# Patient Record
Sex: FEMALE | Race: WHITE | NOT HISPANIC OR LATINO | Employment: FULL TIME | URBAN - METROPOLITAN AREA
[De-identification: names, ages, dates, MRNs, and addresses within clinical notes are randomized per-mention and may not be internally consistent; named-entity substitution may affect disease eponyms.]

---

## 2020-01-21 ASSESSMENT — MIFFLIN-ST. JEOR: SCORE: 1561.63

## 2020-01-22 ENCOUNTER — ANESTHESIA - HEALTHEAST (OUTPATIENT)
Dept: SURGERY | Facility: AMBULATORY SURGERY CENTER | Age: 40
End: 2020-01-22

## 2020-01-23 ENCOUNTER — SURGERY - HEALTHEAST (OUTPATIENT)
Dept: SURGERY | Facility: AMBULATORY SURGERY CENTER | Age: 40
End: 2020-01-23

## 2020-01-23 ASSESSMENT — MIFFLIN-ST. JEOR: SCORE: 1557.66

## 2021-06-04 VITALS — HEIGHT: 66 IN | WEIGHT: 192 LBS | BODY MASS INDEX: 30.86 KG/M2

## 2021-06-05 NOTE — ANESTHESIA CARE TRANSFER NOTE
Last vitals:   Vitals:    01/23/20 1259   BP: 139/75   Pulse: (!) 101   Resp: 16   Temp: 36.2  C (97.2  F)   SpO2: 100%     Patient's level of consciousness is drowsy  Spontaneous respirations: yes  Maintains airway independently: yes  Dentition unchanged: yes  Oropharynx: oropharynx clear of all foreign objects    QCDR Measures:  ASA# 20 - Surgical Safety Checklist: WHO surgical safety checklist completed prior to induction    PQRS# 430 - Adult PONV Prevention: 4558F - Pt received => 2 anti-emetic agents (different classes) preop & intraop  ASA# 8 - Peds PONV Prevention: NA - Not pediatric patient, not GA or 2 or more risk factors NOT present  PQRS# 424 - Petra-op Temp Management: 4559F - At least one body temp DOCUMENTED => 35.5C or 95.9F within required timeframe  PQRS# 426 - PACU Transfer Protocol: - Transfer of care checklist used  ASA# 14 - Acute Post-op Pain: ASA14B - Patient did NOT experience pain >= 7 out of 10

## 2021-06-05 NOTE — ANESTHESIA POSTPROCEDURE EVALUATION
Patient: Linus Lewis  Procedure(s):  LAPAROSCOPIC LEFT OVARIAN CYSTECTOMY, RIGHT OVARIAN CYSTOTOMY  Anesthesia type: general    Patient location: PACU  Last vitals:   Vitals Value Taken Time   /104 1/23/2020  1:45 PM   Temp 36.2  C (97.2  F) 1/23/2020 12:59 PM   Pulse 104 1/23/2020  1:55 PM   Resp 16 1/23/2020  1:34 PM   SpO2 99 % 1/23/2020  1:55 PM   Vitals shown include unvalidated device data.  Post vital signs: stable  Level of consciousness: awake and responds to simple questions  Post-anesthesia pain: pain controlled  Post-anesthesia nausea and vomiting: no  Pulmonary: unassisted  Cardiovascular: stable  Hydration: adequate  Anesthetic events: no    QCDR Measures:  ASA# 11 - Petra-op Cardiac Arrest: ASA11B - Patient did NOT experience unanticipated cardiac arrest  ASA# 12 - Petra-op Mortality Rate: ASA12B - Patient did NOT die  ASA# 13 - PACU Re-Intubation Rate: ASA13B - Patient did NOT require a new airway mgmt  ASA# 10 - Composite Anes Safety: ASA10A - No serious adverse event    Additional Notes:

## 2021-07-03 NOTE — ANESTHESIA PREPROCEDURE EVALUATION
Anesthesia Preprocedure Evaluation by Zamzam Isaac MD at 1/23/2020 11:24 AM     Author: Zamzam Isaac MD Service: -- Author Type: Physician    Filed: 1/23/2020 11:25 AM Date of Service: 1/23/2020 11:24 AM Status: Signed    : Zamzam Isaac MD (Physician)       Anesthesia Evaluation      Patient summary reviewed   No history of anesthetic complications     Airway   Mallampati: II  Neck ROM: full   Pulmonary - negative ROS and normal exam                          Cardiovascular - negative ROS and normal exam   Neuro/Psych    (+) neuromuscular disease,  depression, anxiety/panic attacks,     Endo/Other    (+) hypothyroidism, obesity (BMI 31),      GI/Hepatic/Renal - negative ROS           Dental                             Anesthesia Plan  Planned anesthetic: general endotracheal    ASA 2   Induction: intravenous   Anesthetic plan and risks discussed with: patient    Post-op plan: routine recovery

## 2022-07-08 LAB — GROUP B STREPTOCOCCUS (EXTERNAL): POSITIVE

## 2022-07-24 ENCOUNTER — ANESTHESIA (OUTPATIENT)
Dept: SURGERY | Facility: CLINIC | Age: 42
End: 2022-07-24
Payer: COMMERCIAL

## 2022-07-24 ENCOUNTER — ANESTHESIA EVENT (OUTPATIENT)
Dept: SURGERY | Facility: CLINIC | Age: 42
End: 2022-07-24
Payer: COMMERCIAL

## 2022-07-24 ENCOUNTER — HOSPITAL ENCOUNTER (INPATIENT)
Facility: CLINIC | Age: 42
LOS: 4 days | Discharge: HOME HEALTH | End: 2022-07-28
Attending: OBSTETRICS & GYNECOLOGY | Admitting: OBSTETRICS & GYNECOLOGY
Payer: COMMERCIAL

## 2022-07-24 DIAGNOSIS — D62 ABLA (ACUTE BLOOD LOSS ANEMIA): ICD-10-CM

## 2022-07-24 DIAGNOSIS — O13.9 GESTATIONAL HYPERTENSION, ANTEPARTUM: ICD-10-CM

## 2022-07-24 LAB
ABO/RH(D): NORMAL
ALT SERPL W P-5'-P-CCNC: 23 U/L (ref 0–50)
ANTIBODY SCREEN: NEGATIVE
AST SERPL W P-5'-P-CCNC: 19 U/L (ref 0–45)
CREAT SERPL-MCNC: 0.66 MG/DL (ref 0.52–1.04)
CREAT UR-MCNC: 143 MG/DL
ERYTHROCYTE [DISTWIDTH] IN BLOOD BY AUTOMATED COUNT: 15.4 % (ref 10–15)
GFR SERPL CREATININE-BSD FRML MDRD: >90 ML/MIN/1.73M2
HCT VFR BLD AUTO: 38.2 % (ref 35–47)
HGB BLD-MCNC: 12.2 G/DL (ref 11.7–15.7)
MCH RBC QN AUTO: 27.7 PG (ref 26.5–33)
MCHC RBC AUTO-ENTMCNC: 31.9 G/DL (ref 31.5–36.5)
MCV RBC AUTO: 87 FL (ref 78–100)
PLATELET # BLD AUTO: 183 10E3/UL (ref 150–450)
PROT UR-MCNC: 0.38 G/L
PROT/CREAT 24H UR: 0.27 G/G CR (ref 0–0.2)
RBC # BLD AUTO: 4.41 10E6/UL (ref 3.8–5.2)
SARS-COV-2 RNA RESP QL NAA+PROBE: NEGATIVE
SPECIMEN EXPIRATION DATE: NORMAL
T PALLIDUM AB SER QL: NONREACTIVE
WBC # BLD AUTO: 11.5 10E3/UL (ref 4–11)

## 2022-07-24 PROCEDURE — 250N000009 HC RX 250: Performed by: OBSTETRICS & GYNECOLOGY

## 2022-07-24 PROCEDURE — 258N000003 HC RX IP 258 OP 636: Performed by: OBSTETRICS & GYNECOLOGY

## 2022-07-24 PROCEDURE — 120N000001 HC R&B MED SURG/OB

## 2022-07-24 PROCEDURE — 85027 COMPLETE CBC AUTOMATED: CPT | Performed by: OBSTETRICS & GYNECOLOGY

## 2022-07-24 PROCEDURE — 84460 ALANINE AMINO (ALT) (SGPT): CPT | Performed by: OBSTETRICS & GYNECOLOGY

## 2022-07-24 PROCEDURE — U0005 INFEC AGEN DETEC AMPLI PROBE: HCPCS | Performed by: OBSTETRICS & GYNECOLOGY

## 2022-07-24 PROCEDURE — 3E0DXGC INTRODUCTION OF OTHER THERAPEUTIC SUBSTANCE INTO MOUTH AND PHARYNX, EXTERNAL APPROACH: ICD-10-PCS | Performed by: OBSTETRICS & GYNECOLOGY

## 2022-07-24 PROCEDURE — 86850 RBC ANTIBODY SCREEN: CPT | Performed by: OBSTETRICS & GYNECOLOGY

## 2022-07-24 PROCEDURE — 250N000011 HC RX IP 250 OP 636

## 2022-07-24 PROCEDURE — 3E033VJ INTRODUCTION OF OTHER HORMONE INTO PERIPHERAL VEIN, PERCUTANEOUS APPROACH: ICD-10-PCS | Performed by: OBSTETRICS & GYNECOLOGY

## 2022-07-24 PROCEDURE — 250N000011 HC RX IP 250 OP 636: Performed by: ANESTHESIOLOGY

## 2022-07-24 PROCEDURE — 86780 TREPONEMA PALLIDUM: CPT | Performed by: OBSTETRICS & GYNECOLOGY

## 2022-07-24 PROCEDURE — 36415 COLL VENOUS BLD VENIPUNCTURE: CPT | Performed by: OBSTETRICS & GYNECOLOGY

## 2022-07-24 PROCEDURE — 84156 ASSAY OF PROTEIN URINE: CPT | Performed by: OBSTETRICS & GYNECOLOGY

## 2022-07-24 PROCEDURE — 250N000011 HC RX IP 250 OP 636: Performed by: OBSTETRICS & GYNECOLOGY

## 2022-07-24 PROCEDURE — 84450 TRANSFERASE (AST) (SGOT): CPT | Performed by: OBSTETRICS & GYNECOLOGY

## 2022-07-24 PROCEDURE — G0463 HOSPITAL OUTPT CLINIC VISIT: HCPCS

## 2022-07-24 PROCEDURE — 82565 ASSAY OF CREATININE: CPT | Performed by: OBSTETRICS & GYNECOLOGY

## 2022-07-24 PROCEDURE — 250N000013 HC RX MED GY IP 250 OP 250 PS 637: Performed by: OBSTETRICS & GYNECOLOGY

## 2022-07-24 RX ORDER — TERBUTALINE SULFATE 1 MG/ML
0.25 INJECTION, SOLUTION SUBCUTANEOUS
Status: DISCONTINUED | OUTPATIENT
Start: 2022-07-24 | End: 2022-07-25 | Stop reason: HOSPADM

## 2022-07-24 RX ORDER — MISOPROSTOL 200 UG/1
400 TABLET ORAL
Status: DISCONTINUED | OUTPATIENT
Start: 2022-07-24 | End: 2022-07-25 | Stop reason: HOSPADM

## 2022-07-24 RX ORDER — METHYLERGONOVINE MALEATE 0.2 MG/ML
200 INJECTION INTRAVENOUS
Status: DISCONTINUED | OUTPATIENT
Start: 2022-07-24 | End: 2022-07-25 | Stop reason: HOSPADM

## 2022-07-24 RX ORDER — OXYTOCIN/0.9 % SODIUM CHLORIDE 30/500 ML
100-340 PLASTIC BAG, INJECTION (ML) INTRAVENOUS CONTINUOUS PRN
Status: DISCONTINUED | OUTPATIENT
Start: 2022-07-24 | End: 2022-07-25 | Stop reason: CLARIF

## 2022-07-24 RX ORDER — FENTANYL CITRATE 50 UG/ML
100 INJECTION, SOLUTION INTRAMUSCULAR; INTRAVENOUS ONCE
Status: COMPLETED | OUTPATIENT
Start: 2022-07-24 | End: 2022-07-24

## 2022-07-24 RX ORDER — PROCHLORPERAZINE 25 MG
25 SUPPOSITORY, RECTAL RECTAL EVERY 12 HOURS PRN
Status: DISCONTINUED | OUTPATIENT
Start: 2022-07-24 | End: 2022-07-25 | Stop reason: HOSPADM

## 2022-07-24 RX ORDER — FENTANYL CITRATE 50 UG/ML
100 INJECTION, SOLUTION INTRAMUSCULAR; INTRAVENOUS
Status: DISCONTINUED | OUTPATIENT
Start: 2022-07-24 | End: 2022-07-25 | Stop reason: HOSPADM

## 2022-07-24 RX ORDER — MAGNESIUM SULFATE HEPTAHYDRATE 40 MG/ML
2 INJECTION, SOLUTION INTRAVENOUS
Status: DISCONTINUED | OUTPATIENT
Start: 2022-07-24 | End: 2022-07-28 | Stop reason: HOSPADM

## 2022-07-24 RX ORDER — MISOPROSTOL 200 UG/1
800 TABLET ORAL
Status: DISCONTINUED | OUTPATIENT
Start: 2022-07-24 | End: 2022-07-25 | Stop reason: HOSPADM

## 2022-07-24 RX ORDER — LIDOCAINE 40 MG/G
CREAM TOPICAL
Status: DISCONTINUED | OUTPATIENT
Start: 2022-07-24 | End: 2022-07-25

## 2022-07-24 RX ORDER — METOCLOPRAMIDE HYDROCHLORIDE 5 MG/ML
10 INJECTION INTRAMUSCULAR; INTRAVENOUS EVERY 6 HOURS PRN
Status: DISCONTINUED | OUTPATIENT
Start: 2022-07-24 | End: 2022-07-25 | Stop reason: HOSPADM

## 2022-07-24 RX ORDER — NALOXONE HYDROCHLORIDE 0.4 MG/ML
0.2 INJECTION, SOLUTION INTRAMUSCULAR; INTRAVENOUS; SUBCUTANEOUS
Status: DISCONTINUED | OUTPATIENT
Start: 2022-07-24 | End: 2022-07-25 | Stop reason: HOSPADM

## 2022-07-24 RX ORDER — KETOROLAC TROMETHAMINE 30 MG/ML
30 INJECTION, SOLUTION INTRAMUSCULAR; INTRAVENOUS
Status: DISCONTINUED | OUTPATIENT
Start: 2022-07-24 | End: 2022-07-25 | Stop reason: CLARIF

## 2022-07-24 RX ORDER — OXYTOCIN 10 [USP'U]/ML
10 INJECTION, SOLUTION INTRAMUSCULAR; INTRAVENOUS
Status: DISCONTINUED | OUTPATIENT
Start: 2022-07-24 | End: 2022-07-25 | Stop reason: CLARIF

## 2022-07-24 RX ORDER — ACETAMINOPHEN 500 MG
1000 TABLET ORAL EVERY 6 HOURS PRN
Status: DISCONTINUED | OUTPATIENT
Start: 2022-07-24 | End: 2022-07-25 | Stop reason: HOSPADM

## 2022-07-24 RX ORDER — PENICILLIN G 3000000 [IU]/50ML
3 INJECTION, SOLUTION INTRAVENOUS EVERY 4 HOURS
Status: DISCONTINUED | OUTPATIENT
Start: 2022-07-24 | End: 2022-07-25 | Stop reason: HOSPADM

## 2022-07-24 RX ORDER — HYDROXYZINE HYDROCHLORIDE 50 MG/1
50 TABLET, FILM COATED ORAL
Status: DISCONTINUED | OUTPATIENT
Start: 2022-07-24 | End: 2022-07-24

## 2022-07-24 RX ORDER — NALBUPHINE HYDROCHLORIDE 10 MG/ML
2.5-5 INJECTION, SOLUTION INTRAMUSCULAR; INTRAVENOUS; SUBCUTANEOUS EVERY 6 HOURS PRN
Status: ACTIVE | OUTPATIENT
Start: 2022-07-24 | End: 2022-07-26

## 2022-07-24 RX ORDER — LORAZEPAM 2 MG/ML
2 INJECTION INTRAMUSCULAR
Status: DISCONTINUED | OUTPATIENT
Start: 2022-07-24 | End: 2022-07-28 | Stop reason: HOSPADM

## 2022-07-24 RX ORDER — PNV NO.95/FERROUS FUM/FOLIC AC 28MG-0.8MG
1 TABLET ORAL DAILY
COMMUNITY

## 2022-07-24 RX ORDER — TRANEXAMIC ACID 10 MG/ML
1 INJECTION, SOLUTION INTRAVENOUS EVERY 30 MIN PRN
Status: DISCONTINUED | OUTPATIENT
Start: 2022-07-24 | End: 2022-07-25 | Stop reason: HOSPADM

## 2022-07-24 RX ORDER — ONDANSETRON 4 MG/1
4 TABLET, ORALLY DISINTEGRATING ORAL EVERY 6 HOURS PRN
Status: DISCONTINUED | OUTPATIENT
Start: 2022-07-24 | End: 2022-07-25 | Stop reason: HOSPADM

## 2022-07-24 RX ORDER — PANTOPRAZOLE SODIUM 40 MG/1
40 TABLET, DELAYED RELEASE ORAL
Status: DISCONTINUED | OUTPATIENT
Start: 2022-07-24 | End: 2022-07-28 | Stop reason: HOSPADM

## 2022-07-24 RX ORDER — MAGNESIUM SULFATE HEPTAHYDRATE 40 MG/ML
4 INJECTION, SOLUTION INTRAVENOUS
Status: DISCONTINUED | OUTPATIENT
Start: 2022-07-24 | End: 2022-07-28 | Stop reason: HOSPADM

## 2022-07-24 RX ORDER — TERBUTALINE SULFATE 1 MG/ML
0.25 INJECTION, SOLUTION SUBCUTANEOUS
Status: DISCONTINUED | OUTPATIENT
Start: 2022-07-24 | End: 2022-07-24

## 2022-07-24 RX ORDER — LABETALOL HYDROCHLORIDE 5 MG/ML
20-80 INJECTION, SOLUTION INTRAVENOUS EVERY 10 MIN PRN
Status: DISCONTINUED | OUTPATIENT
Start: 2022-07-24 | End: 2022-07-28 | Stop reason: HOSPADM

## 2022-07-24 RX ORDER — LIDOCAINE 40 MG/G
CREAM TOPICAL
Status: DISCONTINUED | OUTPATIENT
Start: 2022-07-24 | End: 2022-07-25 | Stop reason: HOSPADM

## 2022-07-24 RX ORDER — SODIUM CHLORIDE, SODIUM LACTATE, POTASSIUM CHLORIDE, CALCIUM CHLORIDE 600; 310; 30; 20 MG/100ML; MG/100ML; MG/100ML; MG/100ML
10-125 INJECTION, SOLUTION INTRAVENOUS CONTINUOUS
Status: DISCONTINUED | OUTPATIENT
Start: 2022-07-24 | End: 2022-07-24

## 2022-07-24 RX ORDER — OXYTOCIN/0.9 % SODIUM CHLORIDE 30/500 ML
340 PLASTIC BAG, INJECTION (ML) INTRAVENOUS CONTINUOUS PRN
Status: DISCONTINUED | OUTPATIENT
Start: 2022-07-24 | End: 2022-07-25 | Stop reason: HOSPADM

## 2022-07-24 RX ORDER — MISOPROSTOL 100 UG/1
25 TABLET ORAL
Status: DISCONTINUED | OUTPATIENT
Start: 2022-07-24 | End: 2022-07-24

## 2022-07-24 RX ORDER — PENICILLIN G POTASSIUM 5000000 [IU]/1
5 INJECTION, POWDER, FOR SOLUTION INTRAMUSCULAR; INTRAVENOUS ONCE
Status: COMPLETED | OUTPATIENT
Start: 2022-07-24 | End: 2022-07-24

## 2022-07-24 RX ORDER — NALOXONE HYDROCHLORIDE 0.4 MG/ML
0.4 INJECTION, SOLUTION INTRAMUSCULAR; INTRAVENOUS; SUBCUTANEOUS
Status: DISCONTINUED | OUTPATIENT
Start: 2022-07-24 | End: 2022-07-25 | Stop reason: HOSPADM

## 2022-07-24 RX ORDER — ACETAMINOPHEN 325 MG/1
650 TABLET ORAL EVERY 4 HOURS PRN
Status: DISCONTINUED | OUTPATIENT
Start: 2022-07-24 | End: 2022-07-24

## 2022-07-24 RX ORDER — METOCLOPRAMIDE 10 MG/1
10 TABLET ORAL EVERY 6 HOURS PRN
Status: DISCONTINUED | OUTPATIENT
Start: 2022-07-24 | End: 2022-07-25 | Stop reason: HOSPADM

## 2022-07-24 RX ORDER — HYDRALAZINE HYDROCHLORIDE 20 MG/ML
10 INJECTION INTRAMUSCULAR; INTRAVENOUS
Status: DISCONTINUED | OUTPATIENT
Start: 2022-07-24 | End: 2022-07-28 | Stop reason: HOSPADM

## 2022-07-24 RX ORDER — EPHEDRINE SULFATE 50 MG/ML
5 INJECTION, SOLUTION INTRAMUSCULAR; INTRAVENOUS; SUBCUTANEOUS
Status: DISCONTINUED | OUTPATIENT
Start: 2022-07-24 | End: 2022-07-25 | Stop reason: HOSPADM

## 2022-07-24 RX ORDER — FENTANYL/BUPIVACAINE/NS/PF 2-1250MCG
PLASTIC BAG, INJECTION (ML) INJECTION
Status: COMPLETED
Start: 2022-07-24 | End: 2022-07-24

## 2022-07-24 RX ORDER — CARBOPROST TROMETHAMINE 250 UG/ML
250 INJECTION, SOLUTION INTRAMUSCULAR
Status: DISCONTINUED | OUTPATIENT
Start: 2022-07-24 | End: 2022-07-25 | Stop reason: HOSPADM

## 2022-07-24 RX ORDER — PANTOPRAZOLE SODIUM 40 MG/1
40 TABLET, DELAYED RELEASE ORAL
Status: DISCONTINUED | OUTPATIENT
Start: 2022-07-25 | End: 2022-07-24

## 2022-07-24 RX ORDER — SODIUM CHLORIDE, SODIUM LACTATE, POTASSIUM CHLORIDE, CALCIUM CHLORIDE 600; 310; 30; 20 MG/100ML; MG/100ML; MG/100ML; MG/100ML
INJECTION, SOLUTION INTRAVENOUS CONTINUOUS PRN
Status: DISCONTINUED | OUTPATIENT
Start: 2022-07-24 | End: 2022-07-25 | Stop reason: HOSPADM

## 2022-07-24 RX ORDER — IBUPROFEN 800 MG/1
800 TABLET, FILM COATED ORAL
Status: DISCONTINUED | OUTPATIENT
Start: 2022-07-24 | End: 2022-07-25 | Stop reason: CLARIF

## 2022-07-24 RX ORDER — SODIUM CHLORIDE, SODIUM LACTATE, POTASSIUM CHLORIDE, CALCIUM CHLORIDE 600; 310; 30; 20 MG/100ML; MG/100ML; MG/100ML; MG/100ML
INJECTION, SOLUTION INTRAVENOUS CONTINUOUS
Status: DISCONTINUED | OUTPATIENT
Start: 2022-07-24 | End: 2022-07-25 | Stop reason: HOSPADM

## 2022-07-24 RX ORDER — MORPHINE SULFATE 10 MG/ML
10 INJECTION, SOLUTION INTRAMUSCULAR; INTRAVENOUS
Status: DISCONTINUED | OUTPATIENT
Start: 2022-07-24 | End: 2022-07-24

## 2022-07-24 RX ORDER — OXYTOCIN 10 [USP'U]/ML
10 INJECTION, SOLUTION INTRAMUSCULAR; INTRAVENOUS
Status: DISCONTINUED | OUTPATIENT
Start: 2022-07-24 | End: 2022-07-25 | Stop reason: HOSPADM

## 2022-07-24 RX ORDER — ONDANSETRON 2 MG/ML
4 INJECTION INTRAMUSCULAR; INTRAVENOUS EVERY 6 HOURS PRN
Status: DISCONTINUED | OUTPATIENT
Start: 2022-07-24 | End: 2022-07-25 | Stop reason: HOSPADM

## 2022-07-24 RX ORDER — MAGNESIUM SULFATE HEPTAHYDRATE 500 MG/ML
10 INJECTION, SOLUTION INTRAMUSCULAR; INTRAVENOUS
Status: DISCONTINUED | OUTPATIENT
Start: 2022-07-24 | End: 2022-07-28 | Stop reason: HOSPADM

## 2022-07-24 RX ORDER — PROCHLORPERAZINE MALEATE 10 MG
10 TABLET ORAL EVERY 6 HOURS PRN
Status: DISCONTINUED | OUTPATIENT
Start: 2022-07-24 | End: 2022-07-25 | Stop reason: HOSPADM

## 2022-07-24 RX ORDER — OXYTOCIN/0.9 % SODIUM CHLORIDE 30/500 ML
1-24 PLASTIC BAG, INJECTION (ML) INTRAVENOUS CONTINUOUS
Status: DISCONTINUED | OUTPATIENT
Start: 2022-07-24 | End: 2022-07-25 | Stop reason: HOSPADM

## 2022-07-24 RX ORDER — FENTANYL CITRATE 50 UG/ML
INJECTION, SOLUTION INTRAMUSCULAR; INTRAVENOUS
Status: COMPLETED | OUTPATIENT
Start: 2022-07-24 | End: 2022-07-24

## 2022-07-24 RX ORDER — CITRIC ACID/SODIUM CITRATE 334-500MG
30 SOLUTION, ORAL ORAL
Status: DISCONTINUED | OUTPATIENT
Start: 2022-07-24 | End: 2022-07-25 | Stop reason: HOSPADM

## 2022-07-24 RX ADMIN — MISOPROSTOL 25 MCG: 100 TABLET ORAL at 10:14

## 2022-07-24 RX ADMIN — Medication: at 17:27

## 2022-07-24 RX ADMIN — FENTANYL CITRATE 100 MCG: 0.05 INJECTION, SOLUTION INTRAMUSCULAR; INTRAVENOUS at 11:18

## 2022-07-24 RX ADMIN — ONDANSETRON 4 MG: 2 INJECTION INTRAMUSCULAR; INTRAVENOUS at 07:42

## 2022-07-24 RX ADMIN — FENTANYL CITRATE 100 MCG: 50 INJECTION, SOLUTION INTRAMUSCULAR; INTRAVENOUS at 12:11

## 2022-07-24 RX ADMIN — Medication: at 12:20

## 2022-07-24 RX ADMIN — ACETAMINOPHEN 1000 MG: 500 TABLET, FILM COATED ORAL at 09:56

## 2022-07-24 RX ADMIN — SODIUM CHLORIDE, POTASSIUM CHLORIDE, SODIUM LACTATE AND CALCIUM CHLORIDE 500 ML: 600; 310; 30; 20 INJECTION, SOLUTION INTRAVENOUS at 11:31

## 2022-07-24 RX ADMIN — Medication: at 23:07

## 2022-07-24 RX ADMIN — LABETALOL HYDROCHLORIDE 20 MG: 5 INJECTION, SOLUTION INTRAVENOUS at 12:33

## 2022-07-24 RX ADMIN — PENICILLIN G POTASSIUM 5 MILLION UNITS: 5000000 POWDER, FOR SOLUTION INTRAMUSCULAR; INTRAPLEURAL; INTRATHECAL; INTRAVENOUS at 11:22

## 2022-07-24 RX ADMIN — Medication 2 MILLI-UNITS/MIN: at 17:15

## 2022-07-24 RX ADMIN — SODIUM CHLORIDE, POTASSIUM CHLORIDE, SODIUM LACTATE AND CALCIUM CHLORIDE: 600; 310; 30; 20 INJECTION, SOLUTION INTRAVENOUS at 16:24

## 2022-07-24 RX ADMIN — PENICILLIN G 3 MILLION UNITS: 3000000 INJECTION, SOLUTION INTRAVENOUS at 23:28

## 2022-07-24 RX ADMIN — PENICILLIN G 3 MILLION UNITS: 3000000 INJECTION, SOLUTION INTRAVENOUS at 15:38

## 2022-07-24 RX ADMIN — PENICILLIN G 3 MILLION UNITS: 3000000 INJECTION, SOLUTION INTRAVENOUS at 19:22

## 2022-07-24 RX ADMIN — PANTOPRAZOLE SODIUM 40 MG: 40 TABLET, DELAYED RELEASE ORAL at 15:47

## 2022-07-24 ASSESSMENT — ACTIVITIES OF DAILY LIVING (ADL)
ADLS_ACUITY_SCORE: 18
ADLS_ACUITY_SCORE: 31
ADLS_ACUITY_SCORE: 18

## 2022-07-24 NOTE — ANESTHESIA PREPROCEDURE EVALUATION
Anesthesia Pre-Procedure Evaluation    Patient: Linus Lewis   MRN: [id number] : 1980        Procedure :           Past Medical History:   Diagnosis Date     Anxiety      Depression      Disease of thyroid gland      Fibromyalgia       Past Surgical History:   Procedure Laterality Date     INSERT INTRACORONARY STENT Left     Arm     MS LAP,RMV  ADNEXAL STRUCTURE N/A 2020    Procedure: LAPAROSCOPIC LEFT OVARIAN CYSTECTOMY, RIGHT OVARIAN CYSTOTOMY;  Surgeon: Lito Matt MD;  Location: [facility];  Service: Gynecology      No Known Allergies   Social History     Tobacco Use     Smoking status: Never Smoker     Smokeless tobacco: Never Used   Substance Use Topics     Alcohol use: Not Currently     Comment: Alcoholic Drinks/day: Rare      Wt Readings from Last 1 Encounters:   22 108.9 kg (240 lb)        Anesthesia Evaluation   Pt has had prior anesthetic.     No history of anesthetic complications       ROS/MED HX  ENT/Pulmonary:  - neg pulmonary ROS     Neurologic:  - neg neurologic ROS     Cardiovascular:  - neg cardiovascular ROS     METS/Exercise Tolerance:     Hematologic:       Musculoskeletal:       GI/Hepatic:  - neg GI/hepatic ROS     Renal/Genitourinary:       Endo:     (+) thyroid problem,     Psychiatric/Substance Use:     (+) psychiatric history anxiety and depression     Infectious Disease:       Malignancy:       Other:            Physical Exam    Airway         TM distance: > 3 FB   Neck ROM: full   Mouth opening: > 3 cm    Respiratory Devices and Support         Dental           Cardiovascular             Pulmonary                   OUTSIDE LABS:  CBC:   Lab Results   Component Value Date    WBC 11.5 (H) 2022    HGB 12.2 2022    HCT 38.2 2022     2022     BMP:   Lab Results   Component Value Date    CR 0.66 2022     COAGS: No results found for: PTT, INR, FIBR  POC: No results found for: BGM, HCG, HCGS  HEPATIC:   Lab Results    Component Value Date    ALT 2022    AST 2022     OTHER: No results found for: PH, LACT, A1C, VENESSA, PHOS, MAG, LIPASE, AMYLASE, TSH, T4, T3, CRP, SED    Anesthesia Plan    ASA Status:  2      Anesthesia Type: Epidural.              Consents    Anesthesia Plan(s) and associated risks, benefits, and realistic alternatives discussed. Questions answered and patient/representative(s) expressed understanding.    - Discussed:     - Discussed with:  Patient         Postoperative Care            Comments:    Other Comments:  for Category 2 tracing       neg OB ROS.       Cal Triana MD

## 2022-07-24 NOTE — PROVIDER NOTIFICATION
07/24/22 0938   Provider Notification   Provider Name/Title Dr Benton   Method of Notification At Bedside   Dr Benton at bedside talking to patient about POC. Reviewed EFM tracing, discussed contraction pattern, irreg q5-10 min. SVE done closed. Orders for buccal cytotec. May have regular diet

## 2022-07-24 NOTE — PROVIDER NOTIFICATION
07/24/22 0730   Provider Notification   Provider Name/Title Dr Benton   Method of Notification Phone   Request Evaluate - Remote   Notification Reason Status Update   Updated re: pt story, BP values. Cristo approx every 10 min, mild. EFM not yet reactive. SVE done, unable to reach cervical os, very posterior, thick. Orders to continue to monitor BP's and await lab results.

## 2022-07-24 NOTE — ANESTHESIA PROCEDURE NOTES
Epidural catheter Procedure Note    Pre-Procedure   Staff -        Anesthesiologist:  Cal Triana MD       Performed By: anesthesiologist       Location: OB       Procedure Start/Stop Times: 7/24/2022 12:03 PM and 7/24/2022 12:20 PM       Pre-Anesthestic Checklist: patient identified, IV checked, risks and benefits discussed, informed consent, monitors and equipment checked, pre-op evaluation and at physician/surgeon's request  Timeout:       Correct Patient: Yes        Correct Procedure: Yes        Correct Site: Yes        Correct Position: Yes   Procedure Documentation  Procedure: epidural catheter       Patient Position: sitting       Patient Prep/Sterile Barriers: sterile gloves, mask, patient draped       Skin prep: Betadine       Local skin infiltrated with 3 mL of 1% lidocaine.        Insertion Site: L3-4. (midline approach).       Technique: LORT saline        REECE at 6 cm.       Needle Type: Image Searchery needle       Needle Gauge: 17.        Needle Length (Inches): 3.5        Catheter: 19 G.          Catheter threaded easily.         5 cm epidural space.         Threaded 11 cm at skin.         # of attempts: 1 and  # of redirects:  0    Assessment/Narrative         Paresthesias: No.       Test dose of 3 mL lidocaine 1.5% w/ 1:200,000 epinephrine at 12:11 CDT.        .       Insertion/Infusion Method: LORT saline       Aspiration negative for Heme or CSF via Epidural Catheter.    Medication(s) Administered   0.125% Bupivacaine + 2 mcg/mL Fentanyl via CADD (Epidural) - EPIDURAL   10 mL - 7/24/2022 12:11:00 PM  Fentanyl PF (Epidural) - EPIDURAL   100 mcg - 7/24/2022 12:11:00 PM  Medication Administration Time: 7/24/2022 12:03 PM     Comments:  [id number], [id number], exp. 2024-02-29

## 2022-07-24 NOTE — PLAN OF CARE
Pt laid down to left side after epidural. BP cuff on dependent left arm. Patient experiencing very strong contraction pain, hyperventilating. Coached to deep breath and slow breaths. First BP following epidural at 1217 was severe range 168/97. Multiple severe range BP's during q2min post epidural BP checks. Will treat BP per protocol if remains severe range at 1232, as this will be 15 min post first severe range BP.

## 2022-07-24 NOTE — PROVIDER NOTIFICATION
07/24/22 1241   Provider Notification   Provider Name/Title Dr. Benton   Method of Notification Phone   Request Evaluate - Remote   Notification Reason Maternal Vital Sign Change     Dr. Benton notified of severe range BP requiring treatment per protocol with 20mg labetolol administered per BETHANY Bardales x1 after which severe range Bps resolved.

## 2022-07-24 NOTE — PROVIDER NOTIFICATION
07/24/22 1416   Provider Notification   Provider Name/Title Dr Benton   Method of Notification In Department   Notification Reason Status Update   Updated re: contractions mostly every 5 minutes, last SVE 4/100/-1 at 1300. Plan for cervical exam at 1500, if unchanged orders to start pit aug.

## 2022-07-24 NOTE — PLAN OF CARE
Data: [facility]  6:30 AM.  Reason for maternal/fetal assessment is uterine contractions, elevated blood pressures. Patient reports uterine contractions throughout the day yesterday, but getting more consistent and painful at about 3 AM. Patient also reports elevated blood pressures at home of 190 systolic.  Patient is a .  Prenatal record reviewed. Pregnancy  has been complicated by gestational hypertension; pressures have been consistently 130s-140s/80s-90s without abnormal labwork or symptoms.   Gestational Age 38w4d. VSS. Fetal movement active. Patient denies leaking of vaginal fluid/rupture of membranes, vaginal bleeding, abdominal pain, pelvic pressure, nausea, vomiting, headache, visual disturbances, epigastric or URQ pain, significant edema. Support person Brien is present.   Action: Verbal consent for EFM. Triage assessment completed. Bill of rights reviewed.  Response: Patient verbalized agreement with plan. Will contact Dr Zamzam Osman with update and for further orders.

## 2022-07-24 NOTE — PROVIDER NOTIFICATION
07/24/22 0655   Provider Notification   Provider Name/Title Dr. Mota   Method of Notification Phone     MD notified of patient arrival and admitting complaint of elevated BPs at home with painful contractions since 0300. G+P, gestation, GBS + status reviewed. Pt has history of gestational hypertension with elevated pressures throughout pregnancy with normal labwork and no symptoms. First BP is 182/101 with pulse of 90. BP set to repeat in 15 minutes from first BP. An RN is working on obtaining IV access at this time.     FHR tracing shows moderate variability, no accels, 1 variable deceleration. Two contractions present which patient is breathing through. SVE not yet completed.     TORB for intrapartum orders, hypertension management orderset with labetalol for severe range pressure treatment. Per MD no oral nifedipine at this time, give IV labetalol if repeat BP is severe range. TORB for SVE and to update Dr. Benton to determine plan for labor/induction.

## 2022-07-24 NOTE — H&P
Labor & Delivery History & Physical  Patient Name:  Linus Lewis   MRN:  [id number]  Age:  42 year old    YOB: 1980      Subjective:    Linus Lewis is a 42 year old  female with JORGE: 8/3/2022, by Patient Reported,  Gestational Age: 38w4d who presents for contractions and elevated Bps.  She says contractions started late last night, about every 6-7 mins.     She has h/o GHTN, with mild range Bps at 32 wga.  HELLP labs were normal on .  She states at home, her Bps were as high as 190/90s. Denies headache, vision change, CP, SOB, n/v, upper abdominal pain, or increased swelling.     Patient denies leaking of fluid or vaginal bleeding.  Fetal Movement: present.       Prenatal care with [facility].   Prenatal care complicated by:  - GHTN: normal HELLP labs on 22  - Thyroid disorder: TSH normal on 7/15/22   - B pos  - GBS pos: no PCN allergy      Pregnancy Episode Problems (from 22 to present)     No problems associated with this episode.        OB History    Para Term  AB Living   2 0 0 0 1 0   SAB IAB Ectopic Multiple Live Births   1 0 0 0 0      # Outcome Date GA Lbr Zaheer/2nd Weight Sex Delivery Anes PTL Lv   2 Current            1 SAB              Past Medical History:   Diagnosis Date     Anxiety      Depression      Disease of thyroid gland      Fibromyalgia      Past Surgical History:   Procedure Laterality Date     INSERT INTRACORONARY STENT Left     Arm     SC LAP,RMV  ADNEXAL STRUCTURE N/A 2020    Procedure: LAPAROSCOPIC LEFT OVARIAN CYSTECTOMY, RIGHT OVARIAN CYSTOTOMY;  Surgeon: Lito Matt MD;  Location: [facility];  Service: Gynecology     Social History     Tobacco Use     Smoking status: Never Smoker     Smokeless tobacco: Never Used   Substance Use Topics     Alcohol use: Not Currently     Comment: Alcoholic Drinks/day: Rare     Drug use: Not Currently      History   Drug Use Unknown     History reviewed. No pertinent family  history.  [unfilled]   Medications Prior to Admission   Medication Sig Dispense Refill Last Dose     omeprazole (PRILOSEC) 20 MG DR capsule Take 20 mg by mouth        sertraline (ZOLOFT) 50 MG tablet Take 50 mg by mouth daily        Prenatal Vit-Fe Fumarate-FA (PRENATAL VITAMIN) 27-0.8 MG TABS Take 1 tablet by mouth daily          There is no immunization history on file for this patient.    Review of Systems - NEGATIVE FOR  Fevers  Chills  Headache  Visual changes  Ear pain  Sore throat  Cough  Shortness of breath  Chest pain  Palpitations  Constipation  Diarrhea  Vomiting  Bloody stools  Hematuria  Dysuria  Muscle weakness  Gait disturbance    Objective:  Temp:  [98  F (36.7  C)-99.1  F (37.3  C)] 99.1  F (37.3  C)  Resp:  [18-20] 18  BP: (130-182)/() 141/80  240 lbs 0 oz      General: General appearance: alert, well appearing, and in no distress.   Lungs:   unlabored   Heart:  regular rate and rhythm   Abdomen: Gravid, nontender   [place]: Contraction Frequency (min): q 5-9 min  Contraction Duration (sec):     FHT: Baseline 140 BPM   Fetal heart variability:moderate  Fetal heart rate accelerations:  Present 15 x 15  Fetal heart rate decelerations: none  Fetal heart rate interpretation:  Category I   Speculum: NA    Cervix: Dilation:   FT  Effacement:     20  Station:             -3  Position: very posterior    Extremities: Trace to 1+ edema bilateral, symmetric edema; neg Patel's sign      Lab Review:    ABO/RH(D)   Date Value Ref Range Status   2022 B POS  Final     Hemoglobin   Date Value Ref Range Status   2022 12.2 11.7 - 15.7 g/dL Final     Hematocrit   Date Value Ref Range Status   2022 38.2 35.0 - 47.0 % Final           Assessment  Linus Lewis is a 42 year old  female with JORGE: 8/3/2022, by Patient Reported,  Gestational Age: 38w4d who presents with GHTN and contractions    Plan  - GHTN:   - HELLP labs on admission normal (Prot:Cr ratio = 0.27)   - Bps normal to mild range,  with an isolated/unsustained severe range initially   - No indication for Mag for sz ppx. Will monitor closely.  If sustained severe range, would treat with IV labetalol and start mag at that time.   - Given gestational age, will proceed with IOL now  - IOL: will need cervical ripening.  Discussed options, including cytotec, cervidil, balloon.  Due to cervical position, balloon would be very difficult to place.  Will start with PO cytotec now, and can attempt balloon at later time.   - She had questions re:CS.  We reviewed common indications, including arrest of dilation, arrest of descent, fetal indications, and uncontrolled Bps.  She declines elective PCS at this time.  She desires no more children.   - FHT: cat I.  Cont CEFM.  - Thyroid disease: TSH normal on 7/15/22   - B pos  - GBS pos: PCN to start when active labor, or at heidi of balloon placement.  - Dispo: anticipate

## 2022-07-25 PROBLEM — N17.8 OTHER ACUTE KIDNEY FAILURE (H): Status: ACTIVE | Noted: 2022-07-25

## 2022-07-25 LAB
ALBUMIN SERPL-MCNC: 1.8 G/DL (ref 3.4–5)
ALP SERPL-CCNC: 132 U/L (ref 40–150)
ALT SERPL W P-5'-P-CCNC: 16 U/L (ref 0–50)
ANION GAP SERPL CALCULATED.3IONS-SCNC: 7 MMOL/L (ref 3–14)
AST SERPL W P-5'-P-CCNC: 19 U/L (ref 0–45)
BILIRUB SERPL-MCNC: 0.6 MG/DL (ref 0.2–1.3)
BUN SERPL-MCNC: 15 MG/DL (ref 7–30)
CALCIUM SERPL-MCNC: 7.9 MG/DL (ref 8.5–10.1)
CHLORIDE BLD-SCNC: 112 MMOL/L (ref 94–109)
CO2 SERPL-SCNC: 21 MMOL/L (ref 20–32)
CREAT SERPL-MCNC: 1.11 MG/DL (ref 0.52–1.04)
CREAT SERPL-MCNC: 1.46 MG/DL (ref 0.52–1.04)
ERYTHROCYTE [DISTWIDTH] IN BLOOD BY AUTOMATED COUNT: 16 % (ref 10–15)
GFR SERPL CREATININE-BSD FRML MDRD: 46 ML/MIN/1.73M2
GFR SERPL CREATININE-BSD FRML MDRD: 64 ML/MIN/1.73M2
GLUCOSE BLD-MCNC: 151 MG/DL (ref 70–99)
HCT VFR BLD AUTO: 30.2 % (ref 35–47)
HGB BLD-MCNC: 9.5 G/DL (ref 11.7–15.7)
MCH RBC QN AUTO: 27.3 PG (ref 26.5–33)
MCHC RBC AUTO-ENTMCNC: 31.5 G/DL (ref 31.5–36.5)
MCV RBC AUTO: 87 FL (ref 78–100)
PLATELET # BLD AUTO: 156 10E3/UL (ref 150–450)
POTASSIUM BLD-SCNC: 4 MMOL/L (ref 3.4–5.3)
PROT SERPL-MCNC: 5.1 G/DL (ref 6.8–8.8)
RBC # BLD AUTO: 3.48 10E6/UL (ref 3.8–5.2)
SODIUM SERPL-SCNC: 140 MMOL/L (ref 133–144)
WBC # BLD AUTO: 19.6 10E3/UL (ref 4–11)

## 2022-07-25 PROCEDURE — 80053 COMPREHEN METABOLIC PANEL: CPT | Performed by: OBSTETRICS & GYNECOLOGY

## 2022-07-25 PROCEDURE — 250N000009 HC RX 250: Performed by: NURSE ANESTHETIST, CERTIFIED REGISTERED

## 2022-07-25 PROCEDURE — 250N000011 HC RX IP 250 OP 636: Performed by: OBSTETRICS & GYNECOLOGY

## 2022-07-25 PROCEDURE — 360N000076 HC SURGERY LEVEL 3, PER MIN: Performed by: OBSTETRICS & GYNECOLOGY

## 2022-07-25 PROCEDURE — 250N000011 HC RX IP 250 OP 636: Performed by: NURSE ANESTHETIST, CERTIFIED REGISTERED

## 2022-07-25 PROCEDURE — 36415 COLL VENOUS BLD VENIPUNCTURE: CPT | Performed by: OBSTETRICS & GYNECOLOGY

## 2022-07-25 PROCEDURE — 272N000001 HC OR GENERAL SUPPLY STERILE: Performed by: OBSTETRICS & GYNECOLOGY

## 2022-07-25 PROCEDURE — 710N000010 HC RECOVERY PHASE 1, LEVEL 2, PER MIN: Performed by: OBSTETRICS & GYNECOLOGY

## 2022-07-25 PROCEDURE — 258N000003 HC RX IP 258 OP 636: Performed by: OBSTETRICS & GYNECOLOGY

## 2022-07-25 PROCEDURE — 82565 ASSAY OF CREATININE: CPT | Performed by: OBSTETRICS & GYNECOLOGY

## 2022-07-25 PROCEDURE — 370N000017 HC ANESTHESIA TECHNICAL FEE, PER MIN: Performed by: OBSTETRICS & GYNECOLOGY

## 2022-07-25 PROCEDURE — 88302 TISSUE EXAM BY PATHOLOGIST: CPT | Mod: TC | Performed by: OBSTETRICS & GYNECOLOGY

## 2022-07-25 PROCEDURE — 250N000009 HC RX 250: Performed by: ANESTHESIOLOGY

## 2022-07-25 PROCEDURE — 120N000001 HC R&B MED SURG/OB

## 2022-07-25 PROCEDURE — 250N000013 HC RX MED GY IP 250 OP 250 PS 637: Performed by: OBSTETRICS & GYNECOLOGY

## 2022-07-25 PROCEDURE — 85027 COMPLETE CBC AUTOMATED: CPT | Performed by: OBSTETRICS & GYNECOLOGY

## 2022-07-25 PROCEDURE — 0UB60ZZ EXCISION OF LEFT FALLOPIAN TUBE, OPEN APPROACH: ICD-10-PCS | Performed by: OBSTETRICS & GYNECOLOGY

## 2022-07-25 RX ORDER — IBUPROFEN 800 MG/1
800 TABLET, FILM COATED ORAL EVERY 6 HOURS PRN
Status: DISCONTINUED | OUTPATIENT
Start: 2022-07-25 | End: 2022-07-28 | Stop reason: HOSPADM

## 2022-07-25 RX ORDER — BISACODYL 10 MG
10 SUPPOSITORY, RECTAL RECTAL DAILY PRN
Status: DISCONTINUED | OUTPATIENT
Start: 2022-07-27 | End: 2022-07-28 | Stop reason: HOSPADM

## 2022-07-25 RX ORDER — MORPHINE SULFATE 1 MG/ML
INJECTION, SOLUTION EPIDURAL; INTRATHECAL; INTRAVENOUS PRN
Status: DISCONTINUED | OUTPATIENT
Start: 2022-07-25 | End: 2022-07-25

## 2022-07-25 RX ORDER — METOCLOPRAMIDE 10 MG/1
10 TABLET ORAL EVERY 6 HOURS PRN
Status: DISCONTINUED | OUTPATIENT
Start: 2022-07-25 | End: 2022-07-28 | Stop reason: HOSPADM

## 2022-07-25 RX ORDER — TRANEXAMIC ACID 10 MG/ML
1 INJECTION, SOLUTION INTRAVENOUS EVERY 30 MIN PRN
Status: DISCONTINUED | OUTPATIENT
Start: 2022-07-25 | End: 2022-07-25 | Stop reason: HOSPADM

## 2022-07-25 RX ORDER — ACETAMINOPHEN 325 MG/1
975 TABLET ORAL ONCE
Status: COMPLETED | OUTPATIENT
Start: 2022-07-25 | End: 2022-07-25

## 2022-07-25 RX ORDER — OXYTOCIN/0.9 % SODIUM CHLORIDE 30/500 ML
340 PLASTIC BAG, INJECTION (ML) INTRAVENOUS CONTINUOUS PRN
Status: DISCONTINUED | OUTPATIENT
Start: 2022-07-25 | End: 2022-07-28 | Stop reason: HOSPADM

## 2022-07-25 RX ORDER — MISOPROSTOL 200 UG/1
400 TABLET ORAL
Status: DISCONTINUED | OUTPATIENT
Start: 2022-07-25 | End: 2022-07-28 | Stop reason: HOSPADM

## 2022-07-25 RX ORDER — ENOXAPARIN SODIUM 100 MG/ML
40 INJECTION SUBCUTANEOUS EVERY 24 HOURS
Status: DISCONTINUED | OUTPATIENT
Start: 2022-07-25 | End: 2022-07-28 | Stop reason: HOSPADM

## 2022-07-25 RX ORDER — LIDOCAINE 40 MG/G
CREAM TOPICAL
Status: DISCONTINUED | OUTPATIENT
Start: 2022-07-25 | End: 2022-07-28 | Stop reason: HOSPADM

## 2022-07-25 RX ORDER — AMOXICILLIN 250 MG
2 CAPSULE ORAL 2 TIMES DAILY
Status: DISCONTINUED | OUTPATIENT
Start: 2022-07-25 | End: 2022-07-28 | Stop reason: HOSPADM

## 2022-07-25 RX ORDER — SODIUM CHLORIDE, SODIUM LACTATE, POTASSIUM CHLORIDE, CALCIUM CHLORIDE 600; 310; 30; 20 MG/100ML; MG/100ML; MG/100ML; MG/100ML
INJECTION, SOLUTION INTRAVENOUS CONTINUOUS
Status: DISCONTINUED | OUTPATIENT
Start: 2022-07-25 | End: 2022-07-25 | Stop reason: HOSPADM

## 2022-07-25 RX ORDER — PROCHLORPERAZINE 25 MG
25 SUPPOSITORY, RECTAL RECTAL EVERY 12 HOURS PRN
Status: DISCONTINUED | OUTPATIENT
Start: 2022-07-25 | End: 2022-07-28 | Stop reason: HOSPADM

## 2022-07-25 RX ORDER — MISOPROSTOL 200 UG/1
800 TABLET ORAL
Status: DISCONTINUED | OUTPATIENT
Start: 2022-07-25 | End: 2022-07-28 | Stop reason: HOSPADM

## 2022-07-25 RX ORDER — OXYTOCIN 10 [USP'U]/ML
10 INJECTION, SOLUTION INTRAMUSCULAR; INTRAVENOUS
Status: DISCONTINUED | OUTPATIENT
Start: 2022-07-25 | End: 2022-07-25 | Stop reason: HOSPADM

## 2022-07-25 RX ORDER — METHYLERGONOVINE MALEATE 0.2 MG/ML
200 INJECTION INTRAVENOUS
Status: DISCONTINUED | OUTPATIENT
Start: 2022-07-25 | End: 2022-07-28 | Stop reason: HOSPADM

## 2022-07-25 RX ORDER — TRANEXAMIC ACID 10 MG/ML
1 INJECTION, SOLUTION INTRAVENOUS EVERY 30 MIN PRN
Status: DISCONTINUED | OUTPATIENT
Start: 2022-07-25 | End: 2022-07-28 | Stop reason: HOSPADM

## 2022-07-25 RX ORDER — ACETAMINOPHEN 325 MG/1
975 TABLET ORAL EVERY 6 HOURS
Status: DISPENSED | OUTPATIENT
Start: 2022-07-25 | End: 2022-07-28

## 2022-07-25 RX ORDER — OXYCODONE HYDROCHLORIDE 5 MG/1
5 TABLET ORAL EVERY 4 HOURS PRN
Status: DISCONTINUED | OUTPATIENT
Start: 2022-07-25 | End: 2022-07-28 | Stop reason: HOSPADM

## 2022-07-25 RX ORDER — NALOXONE HYDROCHLORIDE 0.4 MG/ML
0.2 INJECTION, SOLUTION INTRAMUSCULAR; INTRAVENOUS; SUBCUTANEOUS
Status: DISCONTINUED | OUTPATIENT
Start: 2022-07-25 | End: 2022-07-28 | Stop reason: HOSPADM

## 2022-07-25 RX ORDER — AZITHROMYCIN 500 MG/5ML
500 INJECTION, POWDER, LYOPHILIZED, FOR SOLUTION INTRAVENOUS
Status: COMPLETED | OUTPATIENT
Start: 2022-07-25 | End: 2022-07-25

## 2022-07-25 RX ORDER — METOCLOPRAMIDE HYDROCHLORIDE 5 MG/ML
10 INJECTION INTRAMUSCULAR; INTRAVENOUS EVERY 6 HOURS PRN
Status: DISCONTINUED | OUTPATIENT
Start: 2022-07-25 | End: 2022-07-28 | Stop reason: HOSPADM

## 2022-07-25 RX ORDER — LIDOCAINE HYDROCHLORIDE 20 MG/ML
INJECTION, SOLUTION INFILTRATION; PERINEURAL PRN
Status: DISCONTINUED | OUTPATIENT
Start: 2022-07-25 | End: 2022-07-25

## 2022-07-25 RX ORDER — LIDOCAINE HCL/EPINEPHRINE/PF 2%-1:200K
VIAL (ML) INJECTION PRN
Status: DISCONTINUED | OUTPATIENT
Start: 2022-07-25 | End: 2022-07-25

## 2022-07-25 RX ORDER — PROCHLORPERAZINE MALEATE 10 MG
10 TABLET ORAL EVERY 6 HOURS PRN
Status: DISCONTINUED | OUTPATIENT
Start: 2022-07-25 | End: 2022-07-28 | Stop reason: HOSPADM

## 2022-07-25 RX ORDER — OXYTOCIN/0.9 % SODIUM CHLORIDE 30/500 ML
PLASTIC BAG, INJECTION (ML) INTRAVENOUS PRN
Status: DISCONTINUED | OUTPATIENT
Start: 2022-07-25 | End: 2022-07-25

## 2022-07-25 RX ORDER — ONDANSETRON 2 MG/ML
4 INJECTION INTRAMUSCULAR; INTRAVENOUS EVERY 6 HOURS PRN
Status: DISCONTINUED | OUTPATIENT
Start: 2022-07-25 | End: 2022-07-28 | Stop reason: HOSPADM

## 2022-07-25 RX ORDER — CITRIC ACID/SODIUM CITRATE 334-500MG
30 SOLUTION, ORAL ORAL
Status: COMPLETED | OUTPATIENT
Start: 2022-07-25 | End: 2022-07-25

## 2022-07-25 RX ORDER — OXYTOCIN 10 [USP'U]/ML
10 INJECTION, SOLUTION INTRAMUSCULAR; INTRAVENOUS
Status: DISCONTINUED | OUTPATIENT
Start: 2022-07-25 | End: 2022-07-28 | Stop reason: HOSPADM

## 2022-07-25 RX ORDER — DEXTROSE, SODIUM CHLORIDE, SODIUM LACTATE, POTASSIUM CHLORIDE, AND CALCIUM CHLORIDE 5; .6; .31; .03; .02 G/100ML; G/100ML; G/100ML; G/100ML; G/100ML
INJECTION, SOLUTION INTRAVENOUS CONTINUOUS
Status: DISCONTINUED | OUTPATIENT
Start: 2022-07-25 | End: 2022-07-27

## 2022-07-25 RX ORDER — MISOPROSTOL 200 UG/1
800 TABLET ORAL
Status: DISCONTINUED | OUTPATIENT
Start: 2022-07-25 | End: 2022-07-25 | Stop reason: HOSPADM

## 2022-07-25 RX ORDER — OXYTOCIN/0.9 % SODIUM CHLORIDE 30/500 ML
340 PLASTIC BAG, INJECTION (ML) INTRAVENOUS CONTINUOUS PRN
Status: DISCONTINUED | OUTPATIENT
Start: 2022-07-25 | End: 2022-07-25 | Stop reason: HOSPADM

## 2022-07-25 RX ORDER — MISOPROSTOL 200 UG/1
400 TABLET ORAL
Status: DISCONTINUED | OUTPATIENT
Start: 2022-07-25 | End: 2022-07-25 | Stop reason: HOSPADM

## 2022-07-25 RX ORDER — SIMETHICONE 80 MG
80 TABLET,CHEWABLE ORAL 4 TIMES DAILY PRN
Status: DISCONTINUED | OUTPATIENT
Start: 2022-07-25 | End: 2022-07-28 | Stop reason: HOSPADM

## 2022-07-25 RX ORDER — NALOXONE HYDROCHLORIDE 0.4 MG/ML
0.4 INJECTION, SOLUTION INTRAMUSCULAR; INTRAVENOUS; SUBCUTANEOUS
Status: DISCONTINUED | OUTPATIENT
Start: 2022-07-25 | End: 2022-07-28 | Stop reason: HOSPADM

## 2022-07-25 RX ORDER — LIDOCAINE 40 MG/G
CREAM TOPICAL
Status: DISCONTINUED | OUTPATIENT
Start: 2022-07-25 | End: 2022-07-25 | Stop reason: HOSPADM

## 2022-07-25 RX ORDER — CEFAZOLIN SODIUM 1 G/3ML
INJECTION, POWDER, FOR SOLUTION INTRAMUSCULAR; INTRAVENOUS PRN
Status: DISCONTINUED | OUTPATIENT
Start: 2022-07-25 | End: 2022-07-25

## 2022-07-25 RX ORDER — OXYTOCIN/0.9 % SODIUM CHLORIDE 30/500 ML
100-340 PLASTIC BAG, INJECTION (ML) INTRAVENOUS CONTINUOUS PRN
Status: DISCONTINUED | OUTPATIENT
Start: 2022-07-25 | End: 2022-07-25 | Stop reason: CLARIF

## 2022-07-25 RX ORDER — ACETAMINOPHEN 325 MG/1
650 TABLET ORAL EVERY 4 HOURS PRN
Status: DISCONTINUED | OUTPATIENT
Start: 2022-07-28 | End: 2022-07-28 | Stop reason: HOSPADM

## 2022-07-25 RX ORDER — AMOXICILLIN 250 MG
1 CAPSULE ORAL 2 TIMES DAILY
Status: DISCONTINUED | OUTPATIENT
Start: 2022-07-25 | End: 2022-07-28 | Stop reason: HOSPADM

## 2022-07-25 RX ORDER — OXYTOCIN 10 [USP'U]/ML
10 INJECTION, SOLUTION INTRAMUSCULAR; INTRAVENOUS
Status: DISCONTINUED | OUTPATIENT
Start: 2022-07-25 | End: 2022-07-25 | Stop reason: CLARIF

## 2022-07-25 RX ORDER — KETOROLAC TROMETHAMINE 30 MG/ML
INJECTION, SOLUTION INTRAMUSCULAR; INTRAVENOUS PRN
Status: DISCONTINUED | OUTPATIENT
Start: 2022-07-25 | End: 2022-07-25

## 2022-07-25 RX ORDER — ONDANSETRON 2 MG/ML
INJECTION INTRAMUSCULAR; INTRAVENOUS PRN
Status: DISCONTINUED | OUTPATIENT
Start: 2022-07-25 | End: 2022-07-25

## 2022-07-25 RX ORDER — DEXAMETHASONE SODIUM PHOSPHATE 4 MG/ML
INJECTION, SOLUTION INTRA-ARTICULAR; INTRALESIONAL; INTRAMUSCULAR; INTRAVENOUS; SOFT TISSUE PRN
Status: DISCONTINUED | OUTPATIENT
Start: 2022-07-25 | End: 2022-07-25

## 2022-07-25 RX ORDER — CARBOPROST TROMETHAMINE 250 UG/ML
250 INJECTION, SOLUTION INTRAMUSCULAR
Status: DISCONTINUED | OUTPATIENT
Start: 2022-07-25 | End: 2022-07-28 | Stop reason: HOSPADM

## 2022-07-25 RX ORDER — MODIFIED LANOLIN
OINTMENT (GRAM) TOPICAL
Status: DISCONTINUED | OUTPATIENT
Start: 2022-07-25 | End: 2022-07-28 | Stop reason: HOSPADM

## 2022-07-25 RX ORDER — ONDANSETRON 4 MG/1
4 TABLET, ORALLY DISINTEGRATING ORAL EVERY 6 HOURS PRN
Status: DISCONTINUED | OUTPATIENT
Start: 2022-07-25 | End: 2022-07-28 | Stop reason: HOSPADM

## 2022-07-25 RX ORDER — METHYLERGONOVINE MALEATE 0.2 MG/ML
200 INJECTION INTRAVENOUS
Status: DISCONTINUED | OUTPATIENT
Start: 2022-07-25 | End: 2022-07-25 | Stop reason: HOSPADM

## 2022-07-25 RX ORDER — CARBOPROST TROMETHAMINE 250 UG/ML
250 INJECTION, SOLUTION INTRAMUSCULAR
Status: DISCONTINUED | OUTPATIENT
Start: 2022-07-25 | End: 2022-07-25 | Stop reason: HOSPADM

## 2022-07-25 RX ORDER — HYDROCORTISONE 25 MG/G
CREAM TOPICAL 3 TIMES DAILY PRN
Status: DISCONTINUED | OUTPATIENT
Start: 2022-07-25 | End: 2022-07-28 | Stop reason: HOSPADM

## 2022-07-25 RX ORDER — CEFAZOLIN SODIUM/WATER 2 G/20 ML
2 SYRINGE (ML) INTRAVENOUS SEE ADMIN INSTRUCTIONS
Status: DISCONTINUED | OUTPATIENT
Start: 2022-07-25 | End: 2022-07-25 | Stop reason: HOSPADM

## 2022-07-25 RX ORDER — CEFAZOLIN SODIUM/WATER 2 G/20 ML
2 SYRINGE (ML) INTRAVENOUS
Status: DISCONTINUED | OUTPATIENT
Start: 2022-07-25 | End: 2022-07-25 | Stop reason: HOSPADM

## 2022-07-25 RX ADMIN — CEFAZOLIN 2 G: 1 INJECTION, POWDER, FOR SOLUTION INTRAMUSCULAR; INTRAVENOUS at 02:54

## 2022-07-25 RX ADMIN — SENNOSIDES AND DOCUSATE SODIUM 1 TABLET: 50; 8.6 TABLET ORAL at 21:51

## 2022-07-25 RX ADMIN — SODIUM CHLORIDE, SODIUM LACTATE, POTASSIUM CHLORIDE, CALCIUM CHLORIDE AND DEXTROSE MONOHYDRATE: 5; 600; 310; 30; 20 INJECTION, SOLUTION INTRAVENOUS at 07:22

## 2022-07-25 RX ADMIN — OXYTOCIN-SODIUM CHLORIDE 0.9% IV SOLN 30 UNIT/500ML 399 ML: 30-0.9/5 SOLUTION at 04:03

## 2022-07-25 RX ADMIN — DEXAMETHASONE SODIUM PHOSPHATE 4 MG: 4 INJECTION, SOLUTION INTRA-ARTICULAR; INTRALESIONAL; INTRAMUSCULAR; INTRAVENOUS; SOFT TISSUE at 03:30

## 2022-07-25 RX ADMIN — LIDOCAINE HYDROCHLORIDE,EPINEPHRINE BITARTRATE 2.5 ML: 20; .005 INJECTION, SOLUTION EPIDURAL; INFILTRATION; INTRACAUDAL; PERINEURAL at 02:44

## 2022-07-25 RX ADMIN — ENOXAPARIN SODIUM 40 MG: 40 INJECTION SUBCUTANEOUS at 17:39

## 2022-07-25 RX ADMIN — LIDOCAINE HYDROCHLORIDE,EPINEPHRINE BITARTRATE 5 ML: 20; .005 INJECTION, SOLUTION EPIDURAL; INFILTRATION; INTRACAUDAL; PERINEURAL at 02:35

## 2022-07-25 RX ADMIN — ACETAMINOPHEN 975 MG: 325 TABLET, FILM COATED ORAL at 21:51

## 2022-07-25 RX ADMIN — ACETAMINOPHEN 975 MG: 325 TABLET, FILM COATED ORAL at 15:56

## 2022-07-25 RX ADMIN — ACETAMINOPHEN 975 MG: 325 TABLET, FILM COATED ORAL at 02:41

## 2022-07-25 RX ADMIN — KETOROLAC TROMETHAMINE 30 MG: 30 INJECTION, SOLUTION INTRAMUSCULAR at 04:00

## 2022-07-25 RX ADMIN — SENNOSIDES AND DOCUSATE SODIUM 2 TABLET: 50; 8.6 TABLET ORAL at 09:41

## 2022-07-25 RX ADMIN — KETOROLAC TROMETHAMINE 30 MG: 30 INJECTION, SOLUTION INTRAMUSCULAR; INTRAVENOUS at 12:10

## 2022-07-25 RX ADMIN — MORPHINE SULFATE 3 MG: 1 INJECTION EPIDURAL; INTRATHECAL; INTRAVENOUS at 03:29

## 2022-07-25 RX ADMIN — ACETAMINOPHEN 975 MG: 325 TABLET, FILM COATED ORAL at 09:42

## 2022-07-25 RX ADMIN — LIDOCAINE HYDROCHLORIDE 5 ML: 20 INJECTION, SOLUTION INFILTRATION; PERINEURAL at 02:35

## 2022-07-25 RX ADMIN — LIDOCAINE HYDROCHLORIDE 2.5 ML: 20 INJECTION, SOLUTION INFILTRATION; PERINEURAL at 02:50

## 2022-07-25 RX ADMIN — LIDOCAINE HYDROCHLORIDE 2.5 ML: 20 INJECTION, SOLUTION INFILTRATION; PERINEURAL at 02:44

## 2022-07-25 RX ADMIN — ONDANSETRON HYDROCHLORIDE 4 MG: 2 INJECTION, SOLUTION INTRAVENOUS at 03:19

## 2022-07-25 RX ADMIN — SODIUM CHLORIDE, POTASSIUM CHLORIDE, SODIUM LACTATE AND CALCIUM CHLORIDE: 600; 310; 30; 20 INJECTION, SOLUTION INTRAVENOUS at 04:06

## 2022-07-25 RX ADMIN — AZITHROMYCIN MONOHYDRATE 500 MG: 500 INJECTION, POWDER, LYOPHILIZED, FOR SOLUTION INTRAVENOUS at 02:42

## 2022-07-25 RX ADMIN — IBUPROFEN 800 MG: 800 TABLET, FILM COATED ORAL at 21:59

## 2022-07-25 RX ADMIN — SERTRALINE HYDROCHLORIDE 50 MG: 50 TABLET ORAL at 09:42

## 2022-07-25 RX ADMIN — LIDOCAINE HYDROCHLORIDE,EPINEPHRINE BITARTRATE 2.5 ML: 20; .005 INJECTION, SOLUTION EPIDURAL; INFILTRATION; INTRACAUDAL; PERINEURAL at 02:50

## 2022-07-25 RX ADMIN — PANTOPRAZOLE SODIUM 40 MG: 40 TABLET, DELAYED RELEASE ORAL at 09:41

## 2022-07-25 RX ADMIN — SODIUM CITRATE AND CITRIC ACID MONOHYDRATE 30 ML: 500; 334 SOLUTION ORAL at 02:42

## 2022-07-25 RX ADMIN — OXYTOCIN-SODIUM CHLORIDE 0.9% IV SOLN 30 UNIT/500ML 1 ML: 30-0.9/5 SOLUTION at 03:26

## 2022-07-25 ASSESSMENT — ACTIVITIES OF DAILY LIVING (ADL)
ADLS_ACUITY_SCORE: 18

## 2022-07-25 NOTE — PROVIDER NOTIFICATION
07/25/22 0005   Provider Notification   Provider Name/Title Dr. Ramírez   Method of Notification At Bedside     MDA at bedside for rebolus of epidural. Linus having breakthrough hip and back pain on the left side; pt has been repositioned to this side but FHR tracing required change in position. Linus has used her PCA dose button multiple times with little effect.

## 2022-07-25 NOTE — PROVIDER NOTIFICATION
07/24/22 2236   Provider Notification   Provider Name/Title Dr. Benton   Method of Notification Phone     MD phoned unit; she has received electronic page. She is currently in-house and available as needed. No changes to plan of care at this time. FHR tracing reviewed.

## 2022-07-25 NOTE — PROVIDER NOTIFICATION
07/25/22 0043   Provider Notification   Provider Name/Title Dr. Benton   Method of Notification Phone     MD requested at bedside for consultation on POC. Late decelerations have become more frequent, currently having a period of recurrent lates. Periods of moderate variability present but beginning to trend to minimal variability. Petena has been repositioned, fluid bolus initiated. SVE unchanged apart from fetal station; 6/90/+1.  Cervix is very stretchy but without manipulation 6 cm which was confirmed with charge RN assessment.

## 2022-07-25 NOTE — PROVIDER NOTIFICATION
22 0111   Provider Notification   Provider Name/Title Dr. Benton   Method of Notification At Bedside     MD at bedside for assessment of labor progress. SVE by MD shows very stretchy cervix which could be reduceable. FHR tracing reviewed with Stephen.  delivery vs. attempt to reduce the cervix and push, as baby is very low. Trial push attempted. Some cervix reduced. VORB to leave pitocin at 5 mu; allow pt to labor down in current position; will reassess in a half hour and see if remaining cervix is gone or can be reduced.

## 2022-07-25 NOTE — ANESTHESIA POSTPROCEDURE EVALUATION
Patient: Linus Lewis    Procedure: Procedure(s):   SECTION       Anesthesia Type:  Epidural    Note:  Disposition: Inpatient   Postop Pain Control:    PONV:    Neuro/Psych:    Airway/Respiratory:    CV/Hemodynamics:    Other NRE:    DID A NON-ROUTINE EVENT OCCUR? No    Event details/Postop Comments:  Labor epidural analgesia satisfactory.  Epidursal catheter was used for Caesarean section delivery.  CRNA removed epidural catheter.  No residual sensorimotor blockade.  Patient denies headache, fever or chills.  She will notify postpartum RN of any problems or questions.           Last vitals:  Vitals Value Taken Time   /65 22 0452   Temp     Pulse     Resp     SpO2 97 % 22 0451   Vitals shown include unvalidated device data.    Electronically Signed By: Cal Triana MD  2022  8:25 AM

## 2022-07-25 NOTE — PROVIDER NOTIFICATION
07/24/22 1635   Provider Notification   Provider Name/Title Dr Benton   Method of Notification At Bedside   Notification Reason Status Update   Dr Benton at bedside. IUPC placed due to difficulty tracing with toco.

## 2022-07-25 NOTE — PROVIDER NOTIFICATION
07/24/22 1910   Provider Notification   Provider Name/Title Dr Benton   Method of Notification In Department   Notification Reason Status Update   Dr Benton in department, reviewed tracing. Pit at 4 munits per min. No new orders received.

## 2022-07-25 NOTE — PROGRESS NOTES
In room to assess patient and discuss management plan.  Reviewed FHT with her and discussed recurrent late decels, along with some early and variable decels.      SVE: 9/100/+1  FHT: 145/moderate/+accel/+decels (recurrent late, with some early and variable; saul 120, lasts 30-60 secs)  [place]: q 3-4 min  Pit @ 5mU    Assessment  Linus Lewis is a 42 year old  female with JORGE: 8/3/2022, by Patient Reported,  Gestational Age: 38w4d who presents for IOL for GHTN     Plan  - Labor: discussed at length the cat II FHT as well as SVE.  Given that patient is almost fully dilated and fetal station is quite low, will do trial of pushing.  However, I have a low threshold to proceed with primary .  We also discussed that her pelvic outlet appears narrow, and this may make vaginal delivery difficult.  Fetus feels AGA.    - All questions were answered; both patient and  are agreeable to plan.   - FHT: cat II.  Cont CEFM  - GHTN:              - HELLP labs normal on admission (Prot:Cr ratio = 0.27)              - Bps normal to mild range              - No indication for Mag for sz ppx.   - Thyroid disease: TSH normal on 7/15/22   - B pos  - GBS pos: cont PCN

## 2022-07-25 NOTE — PROVIDER NOTIFICATION
07/24/22 2209   Provider Notification   Provider Name/Title Dr. Benton   Method of Notification Electronic Page     MD web-based paged with following message:    SVE 6/95/0. Pitocin increased to 10 mu. Most recent MVUs 125. Afebrile, but patient feels warm. [id number]

## 2022-07-25 NOTE — PLAN OF CARE
Data: Linus Lewis transferred to 446 via cart at 0740. Baby transferred via parent's arms.  Action: Receiving unit notified of transfer: Yes. Patient and family notified of room change. Report given to Dasha SIMS at 0710. Belongings sent to receiving unit. Accompanied by Registered Nurse. Oriented patient to surroundings. Call light within reach. ID bands double-checked with receiving RN.  Response: Patient tolerated transfer and is stable.

## 2022-07-25 NOTE — PROGRESS NOTES
Patient doing well.  Not feeling contractions.    SVE: 9-10/100/+1  FHT:145/mod/+accel/+decel (had a decel to 80s after pushing x1)  Gardi: q 4 min    Assessment  Linus Lewis is a 42 year old  female with JORGE: 8/3/2022, by Patient Reported,  Gestational Age: 38w4d who presents for IOL for GHTN     Plan  - Labor: Did trial of pushing; pelvic outlet noted to be very narrow (approx <8cm).  Additionally, after pushing, deep fetal decel occurred.  Given entire clinical picture, recommended proceeding with PCS for persistent cat II FHT.    - Reviewed risks of CS, including pain, bleeding, infection, and injury to bowel/bladder/ureters.               - All questions were answered; both patient and  are agreeable to plan.   - FHT: cat II.  Cont CEFM  - GHTN:              - HELLP labs normal on admission (Prot:Cr ratio = 0.27)              - Bps normal to mild range              - No indication for Mag for sz ppx.   - Thyroid disease: TSH normal on 7/15/22   - B pos  - GBS pos: cont PCN  - Contraception:  Patient strongly desires permanent sterilization.   was planning vasectomy.  She would very much like to have tubal at time of PCS.   - Dispo: proceed with PCS+BS/BTL

## 2022-07-25 NOTE — OP NOTE
Surgery Date:  2022  Surgeon:  Dianna Benton MD,  Assistants:  None    Pre-op Diagnosis:  1. Intrauterine pregnancy at 38w5d     2. Persistent cat II FHT     3. Narrow pelvic outlet      4.  IOL for GHTN     5.  Desires permanent sterilization  Post-op Diagnosis:  1. Same      2. Viable male infant   Procedure:  Primary  section with 2 layer uterine closure via Pfannenstiel skin incision and low transverse uterine incision    Anesthesia: Epidural, spinal  EBL:  380 mL  IVF:  Per Anesthesia  UOP:  clear urine at the end of the case  Drains: Sharif Catheter   Specimens:  Cord blood, Left fallopian tube  Complications: None   Indications:   Linus Lewis is a 42 year old  at 38w5d admitted for IOL for GHTN (diverted from [facility]).  She underwent IOL with cytotec x1, SROM, and pitocin.  FHT noted to be persistently cat II.  When cervix was reducible, trial of labor was done - fetal decel noted and pelvic outlet felt to be very narrow.  Decision made to proceed with PCS.  Pt also desires permanent sterilization.  The risks, benefits, and alternatives of  section were discussed with the patient, and she agreed to proceed.  Risks include, but not limited to, pain, bleeding, infection, injury to bowel/bladder/ureters, and need for further surgery.    Findings:   1. Liveborn male infant in cephalic presentation. Apgars 8/9. Weight 7# 4oz.  2. Thin meconium stained amniotic fluid  3. Normal uterus, fallopian tubes, and ovaries.   1. Right fallopian tube very superior and posterior - inaccessible, thus was left in situ  4. Edematous tissue, consistent with long labor  5. Slight extension on left edge of hysterotomy      OPERATIVE PROCEDURE:  The patient was taken to the operating room.  Compression devices were placed on the legs, a Sharif catheter was placed, and a dose of antibiotics was given preoperatively.  The patient was prepped and draped in the usual sterile fashion, and a time out was  performed.  Anesthesia was found to be adequate.    A Pfannenstiel skin incision was made with the scalpel and carried through to the underlying layer of fascia.  The fascia was incised in the midline and the incision was extended laterally with the Azevedo scissors.  The superior aspect of the fascial incision was grasped with the Kocher clamps, elevated and the underlying rectus muscles were dissected off sharply with the Azevedo scissors.  Attention was then turned to the inferior aspect of the incision which, in a similar fashion, was grasped, tented up with the Kocher clamps, and the rectus muscles dissected off sharply.  The rectus muscles were then  in the midline, and the peritoneum was entered bluntly.  The peritoneal incision was extended superiorly and inferiorly with good visualization of the bladder.  The Kyle retractor was then inserted.   The lower uterine segment was incised in a transverse fashion with the scalpel.  The uterine incision was then extended cephalad and caudad with blunt dissection.  Amniotomy was performed with bluntly.  Thin mec stained fluid was noted.  The infant's head was flexed, brought to the incision and with fundal pressure the head was delivered without difficulty.  No nuchal cord was noted.  The shoulders and body were delivered without difficulty.  A vigorous infant was noted.  The infant's nose and mouth were bulb suctioned and the cord was clamped and cut after a 60 second delay.  The infant was handed off to the waiting nurse.      The placenta was then removed manually and the uterus was cleared of all clots and debris.  The uterine incision was closed with 0-Vicryl in a running, locking fashion. The left side was noted to have a slight extension.  This was grasped with an Allis and repaired with 0 vicryl.  Excellent hemostasis was noted.  There was a 3cm hematoma at this edge as well; appeared serosal; monitored for the entirety of the case and noted to be stable  without any expansion.  A second imbricating layer was placed using 0-monocryl using a running, non-locking stich.  Hemostasis was obtained.      Patient again confirmed that she desired permanent sterilization.  The bowel and omentum were overlying the uterus and were displaced with a partially inserted, tagged moist lap.  The left tube was then visualized and grasped with a Sanborn clamp and followed out to the fimbria.  Handheld Ligasure was used to serially clamp, coagulate, and cut the mesosalpinx until approximately 1cm from cornua.  The left tube was removed and the site was noted to be hemostatic.  The right fallopian tube was then grasped with a Sanborn . However, the entire tube was unable to be accessed easily due to a very superior and posterior position.  Due to the large vessels, it was decided to keep the right tube in situ to avoid any unecessary bleeding.     The gutters were cleared.  The Kyle retractor was removed.  The uterine incision was again evaluted and noted to be hemostatic.  The serosal hematoma on left side again noted to be stable. The subfascial areas were inspected, small bleeders were cauterized and hemostasis was confirmed.  The fascia was closed with 0-Vicryl in a running fashion.  The subcutaneous tissue was then inspected and small bleeders were cauterized with the bovie.  Hemostasis was confirmed.  The subcuatenous tissue was reapproximated with 3-0 plaingut using interrupted stitches.  The skin was then closed with 4-0 monocryl in a running subcuticular fasion.      The patient tolerated the procedure well.  Sponge, lap and needle counts were correct times two.  The patient and the baby were taken to the recovery room in stable condition.

## 2022-07-25 NOTE — PROGRESS NOTES
Patient comfortable with contractions.  +LOF.  Discussed difficulty with monitoring contractions.  Reviewed IUPC; she is amenable    FHT: 145/moderate/+accel/-decel  Lytle: unknown  SVE: 5\80/-1    IUPC inserted without difficulty; FHT unchanged.    Assessment  Linus Lewis is a 42 year old  female with JORGE: 8/3/2022, by Patient Reported,  Gestational Age: 38w4d who presents with GHTN and contractions     Plan  - IOL: IUPC placed without complication.  If contractions inadequate, will start pitocin.  - FHT: cat I.  Cont CEFM  - GHTN:              - HELLP labs on admission normal (Prot:Cr ratio = 0.27)              - Bps normal to mild range, with an isolated/unsustained severe range initially              - No indication for Mag for sz ppx. Will monitor closely.  If sustained severe range, would treat with IV labetalol and start mag at that time.  - Thyroid disease: TSH normal on 7/15/22   - B pos  - GBS pos: cont PCN  - Dispo: anticipate

## 2022-07-25 NOTE — PLAN OF CARE
Blood pressures mildly elevated, but within patient's prenatal normal range. Breastfeeding  with minimal staff assistance. Tolerating ice chips and sips of water.Postpartum bleeding, fundal and incisional checks WNL. Bonding well with . Labwork ordered for 1000. Report given to Samia who will transfer patient to  and to Dasha who will assume cares on postpartum.    to comply with meal plan. Encouraged pt to use measuring cups for portion control. Instructed pt to call with any questions. Patient is encouraged to call with further questions or call and re-schedule other sessions within a year from original date. Thank you for referring this patient to our program.  Please do not hesitate to call if you have any questions at ( (JOHANNA or CHAITANYA) or [phone] ([address] [place] [address]).         Sincerely,         Registered Dietitian Nutritionists:          []  SUZANNE White          [x]   Chasidy KIMBALL   []  SUZANNE Aariza  []   SUZANNE Bello           Diabetes

## 2022-07-25 NOTE — PROVIDER NOTIFICATION
07/24/22 2124   Provider Notification   Provider Name/Title Dr. Benton   Method of Notification Phone     MD called unit for update. Updated on contraction pattern of every 2-4 minutes, currently inadequate MVUs totaling in the low 100s. Pitocin has been increased x2 by this writer, currently at 8 mu. FHR tracing showing intermittent late, variable and some early decelerations. Late and variable declerations not recurrent at this time. Currently moderate variability, periods of minimal have been present.     Linus reported that she is starting to feel intermittent pressure. TORB to check cervix at 2145, text page MD with assessment.

## 2022-07-25 NOTE — ANESTHESIA POSTPROCEDURE EVALUATION
Patient: Linus Lewis    Procedure: Procedure(s):   SECTION       Anesthesia Type:  Epidural    Note:     Postop Pain Control: Uneventful            Sign Out: Well controlled pain   PONV: No   Neuro/Psych: Uneventful            Sign Out: Acceptable/Baseline neuro status   Airway/Respiratory: Uneventful            Sign Out: Acceptable/Baseline resp. status   CV/Hemodynamics: Uneventful            Sign Out: Acceptable CV status; No obvious hypovolemia; No obvious fluid overload   Other NRE: NONE   DID A NON-ROUTINE EVENT OCCUR? No           Last vitals:  Vitals:    22 0023 22 0041 22 0148   BP: 134/70 129/69 (!) 140/80   Resp:      Temp:      SpO2:          Electronically Signed By: Liborio Ramírez DO  2022  4:46 AM

## 2022-07-25 NOTE — PROVIDER NOTIFICATION
07/25/22 0213   Provider Notification   Provider Name/Title Dr. Benton   Method of Notification At Bedside     MD at bedside. Pushing efforts resumed. A prolonged deceleration occurred after the first pushing effort. Pushing efforts were stopped and Linus was repositioned to her left side and FHR began to recover to baseline. A C/S delivery was recommended at this time and Linus and Brien consented to the procedure. Pre-op care begun at this time.

## 2022-07-25 NOTE — ANESTHESIA CARE TRANSFER NOTE
Patient: Linus Lewis    Procedure: Procedure(s):   SECTION       Diagnosis: 38 weeks gestation of pregnancy [Z3A.38]  Diagnosis Additional Information: No value filed.    Anesthesia Type:   Epidural     Note:    Oropharynx: oropharynx clear of all foreign objects and spontaneously breathing  Level of Consciousness: awake  Oxygen Supplementation: room air    Independent Airway: airway patency satisfactory and stable  Dentition: dentition unchanged  Vital Signs Stable: post-procedure vital signs reviewed and stable  Report to RN Given: handoff report given  Patient transferred to: Labor and Delivery    Handoff Report: Identifed the Patient, Identified the Reponsible Provider, Reviewed the pertinent medical history, Discussed the surgical course, Reviewed Intra-OP anesthesia mangement and issues during anesthesia, Set expectations for post-procedure period and Allowed opportunity for questions and acknowledgement of understanding      Vitals:  Vitals Value Taken Time   BP     Temp     Pulse     Resp     SpO2         Electronically Signed By: ИРИНА Thomas CRNA  2022  4:29 AM

## 2022-07-25 NOTE — PROVIDER NOTIFICATION
07/25/22 0621   Provider Notification   Provider Name/Title Dr. Benton   Method of Notification Phone     MD notified of elevated creatinine and GFR. Updated on urine output since delivery. TORB to update 1000 hemoglobin to CBC with platelets and CMP.

## 2022-07-26 PROBLEM — D62 ABLA (ACUTE BLOOD LOSS ANEMIA): Status: ACTIVE | Noted: 2022-07-26

## 2022-07-26 PROBLEM — N17.8 OTHER ACUTE KIDNEY FAILURE (H): Status: RESOLVED | Noted: 2022-07-25 | Resolved: 2022-07-26

## 2022-07-26 PROBLEM — O13.9 GESTATIONAL HYPERTENSION: Status: ACTIVE | Noted: 2022-07-26

## 2022-07-26 PROBLEM — Z98.891 S/P PRIMARY LOW TRANSVERSE C-SECTION: Status: ACTIVE | Noted: 2022-07-26

## 2022-07-26 LAB
ALBUMIN SERPL-MCNC: 1.8 G/DL (ref 3.4–5)
ALP SERPL-CCNC: 114 U/L (ref 40–150)
ALT SERPL W P-5'-P-CCNC: 15 U/L (ref 0–50)
ANION GAP SERPL CALCULATED.3IONS-SCNC: 3 MMOL/L (ref 3–14)
AST SERPL W P-5'-P-CCNC: 19 U/L (ref 0–45)
BILIRUB SERPL-MCNC: 0.2 MG/DL (ref 0.2–1.3)
BUN SERPL-MCNC: 15 MG/DL (ref 7–30)
CALCIUM SERPL-MCNC: 8.3 MG/DL (ref 8.5–10.1)
CHLORIDE BLD-SCNC: 111 MMOL/L (ref 94–109)
CO2 SERPL-SCNC: 25 MMOL/L (ref 20–32)
CREAT SERPL-MCNC: 0.89 MG/DL (ref 0.52–1.04)
ERYTHROCYTE [DISTWIDTH] IN BLOOD BY AUTOMATED COUNT: 16.1 % (ref 10–15)
GFR SERPL CREATININE-BSD FRML MDRD: 83 ML/MIN/1.73M2
GLUCOSE BLD-MCNC: 87 MG/DL (ref 70–99)
HCT VFR BLD AUTO: 29.7 % (ref 35–47)
HGB BLD-MCNC: 9.1 G/DL (ref 11.7–15.7)
MCH RBC QN AUTO: 28 PG (ref 26.5–33)
MCHC RBC AUTO-ENTMCNC: 30.6 G/DL (ref 31.5–36.5)
MCV RBC AUTO: 91 FL (ref 78–100)
PATH REPORT.COMMENTS IMP SPEC: NORMAL
PATH REPORT.COMMENTS IMP SPEC: NORMAL
PATH REPORT.FINAL DX SPEC: NORMAL
PATH REPORT.GROSS SPEC: NORMAL
PATH REPORT.MICROSCOPIC SPEC OTHER STN: NORMAL
PATH REPORT.RELEVANT HX SPEC: NORMAL
PHOTO IMAGE: NORMAL
PLATELET # BLD AUTO: 165 10E3/UL (ref 150–450)
POTASSIUM BLD-SCNC: 3.9 MMOL/L (ref 3.4–5.3)
PROT SERPL-MCNC: 5 G/DL (ref 6.8–8.8)
RBC # BLD AUTO: 3.25 10E6/UL (ref 3.8–5.2)
SODIUM SERPL-SCNC: 139 MMOL/L (ref 133–144)
WBC # BLD AUTO: 14.9 10E3/UL (ref 4–11)

## 2022-07-26 PROCEDURE — 85027 COMPLETE CBC AUTOMATED: CPT | Performed by: OBSTETRICS & GYNECOLOGY

## 2022-07-26 PROCEDURE — 250N000011 HC RX IP 250 OP 636: Performed by: OBSTETRICS & GYNECOLOGY

## 2022-07-26 PROCEDURE — 36415 COLL VENOUS BLD VENIPUNCTURE: CPT | Performed by: OBSTETRICS & GYNECOLOGY

## 2022-07-26 PROCEDURE — 88302 TISSUE EXAM BY PATHOLOGIST: CPT | Mod: 26 | Performed by: PATHOLOGY

## 2022-07-26 PROCEDURE — 82310 ASSAY OF CALCIUM: CPT | Performed by: OBSTETRICS & GYNECOLOGY

## 2022-07-26 PROCEDURE — 250N000013 HC RX MED GY IP 250 OP 250 PS 637: Performed by: OBSTETRICS & GYNECOLOGY

## 2022-07-26 PROCEDURE — 999N000080 HC STATISTIC IP LACTATION SERVICES 16-30 MIN

## 2022-07-26 PROCEDURE — 120N000001 HC R&B MED SURG/OB

## 2022-07-26 RX ORDER — FERROUS SULFATE 325(65) MG
325 TABLET ORAL EVERY OTHER DAY
Qty: 30 TABLET | Refills: 1 | Status: SHIPPED | OUTPATIENT
Start: 2022-07-26

## 2022-07-26 RX ORDER — METHYLPREDNISOLONE SODIUM SUCCINATE 125 MG/2ML
125 INJECTION, POWDER, LYOPHILIZED, FOR SOLUTION INTRAMUSCULAR; INTRAVENOUS
Status: DISCONTINUED | OUTPATIENT
Start: 2022-07-26 | End: 2022-07-28 | Stop reason: HOSPADM

## 2022-07-26 RX ORDER — IBUPROFEN 800 MG/1
800 TABLET, FILM COATED ORAL EVERY 6 HOURS PRN
Qty: 60 TABLET | Refills: 1 | Status: SHIPPED | OUTPATIENT
Start: 2022-07-26

## 2022-07-26 RX ORDER — ACETAMINOPHEN 325 MG/1
975 TABLET ORAL EVERY 6 HOURS
Qty: 60 TABLET | Refills: 1 | Status: SHIPPED | OUTPATIENT
Start: 2022-07-26

## 2022-07-26 RX ORDER — AMOXICILLIN 250 MG
1 CAPSULE ORAL 2 TIMES DAILY PRN
Qty: 15 TABLET | Refills: 0 | Status: SHIPPED | OUTPATIENT
Start: 2022-07-26

## 2022-07-26 RX ORDER — DIPHENHYDRAMINE HYDROCHLORIDE 50 MG/ML
50 INJECTION INTRAMUSCULAR; INTRAVENOUS
Status: DISCONTINUED | OUTPATIENT
Start: 2022-07-26 | End: 2022-07-28 | Stop reason: HOSPADM

## 2022-07-26 RX ADMIN — ENOXAPARIN SODIUM 40 MG: 40 INJECTION SUBCUTANEOUS at 17:46

## 2022-07-26 RX ADMIN — IBUPROFEN 800 MG: 800 TABLET, FILM COATED ORAL at 17:45

## 2022-07-26 RX ADMIN — ACETAMINOPHEN 975 MG: 325 TABLET, FILM COATED ORAL at 17:45

## 2022-07-26 RX ADMIN — PANTOPRAZOLE SODIUM 40 MG: 40 TABLET, DELAYED RELEASE ORAL at 08:43

## 2022-07-26 RX ADMIN — ACETAMINOPHEN 975 MG: 325 TABLET, FILM COATED ORAL at 04:18

## 2022-07-26 RX ADMIN — Medication 1 TABLET: at 21:12

## 2022-07-26 RX ADMIN — SERTRALINE HYDROCHLORIDE 50 MG: 50 TABLET ORAL at 08:43

## 2022-07-26 RX ADMIN — SENNOSIDES AND DOCUSATE SODIUM 1 TABLET: 50; 8.6 TABLET ORAL at 08:43

## 2022-07-26 RX ADMIN — IBUPROFEN 800 MG: 800 TABLET, FILM COATED ORAL at 04:18

## 2022-07-26 RX ADMIN — SENNOSIDES AND DOCUSATE SODIUM 1 TABLET: 50; 8.6 TABLET ORAL at 21:12

## 2022-07-26 RX ADMIN — ACETAMINOPHEN 975 MG: 325 TABLET, FILM COATED ORAL at 11:01

## 2022-07-26 RX ADMIN — IBUPROFEN 800 MG: 800 TABLET, FILM COATED ORAL at 11:00

## 2022-07-26 ASSESSMENT — ACTIVITIES OF DAILY LIVING (ADL)
ADLS_ACUITY_SCORE: 18

## 2022-07-26 NOTE — PLAN OF CARE
Data: Patient arrived on postpartum unit via cart @ 740 via recovery nurse Neema GARCIA RN.  A new bag of D5LR infusing at 125 mL and powers intact. VSS and WNL and postpartum check was WNL - see flow chart. Patient's mobility was limited.   Action: Oriented patient to surroundings. Call light within reach. ID bands double-checked with recovery RN - Neema GARCIA  Response: Patient tolerated transfer.

## 2022-07-26 NOTE — PROGRESS NOTES
Patient unavailable with Public Health Nurse (PHN) stopped by to discuss [facility] Public Health Resources.

## 2022-07-26 NOTE — PROGRESS NOTES
[facility]  POD#1      Pt doing well. Ambulating, tolerating PO. Decreased lochia. Pain controlled. Pt voiding without difficulties. Breast feeding and coping well with infant. States she is unsure if she feels dizzy since she has not ambulated much. She is about to try to ambulate more and knows to report symptoms.     Vitals:    22 1210 22 1507 22 2015 22 0852   BP: 118/74 121/74 114/71 127/81   BP Location: Left arm Left arm Left arm Left arm   Patient Position: Semi-Feldman's Semi-Feldman's Semi-Feldman's Semi-Feldman's   Cuff Size:  Adult Regular Adult Regular Adult Regular   Resp: 14 16 16 16   Temp: 97.5  F (36.4  C) 97.5  F (36.4  C) 98.2  F (36.8  C) 97.8  F (36.6  C)   TempSrc: Oral Oral Oral Oral   SpO2: 99% 98%     Weight:       Height:         Abd soft, NTGR, FFBU, no fundal tenderness, incision well approximated with sutures and steri strips, OR dressing dry and clean was removed  Ext no edema, no cyanosis, pulses +     Latest Reference Range & Units 22 07:39 22 11:08 22 04:29   Hemoglobin 11.7 - 15.7 g/dL 12.2 9.5 (L) 9.1 (L)   (L): Data is abnormally low      A/P 42 year old  at 38w5d s/p POD#1 pLTCS + LS,  secondary to persistent NRFHTs.     -hemodynamically stable   - ABLA, asymptomtic so far. Pt will report if any symptoms.   - PO iron, iron rich diet and Vit C   - if symptoms will consider IV iron, pt agreeable  -Rh pos  -Diet regular  -Pain control with Tylenol, Motrin and Oxycodone  -DVT ppx - SCDs while on bed and ambulation  -bowel ppx- Senna/docusate, simethicone  -Breast feeding, encouraged, continue PNV's, proper hydration and caloric intake counseled.  -Tdap and flu given prenatally  -Rubella immune  -BC: not discussed    gHTN  - BP have remained WNL  - denies signs or symptoms consistent with pre-E  - HELLP labs WNL      Plan; continue routine PP care. Anticipate discharge home POD2 or 3.    Dr. Santi Oreilly  [phone]  2022  9:39 AM

## 2022-07-26 NOTE — DISCHARGE SUMMARY
[facility]    Discharge Summary  Obstetrics    Date of Admission:  2022  Date of Discharge:  2022   Discharging Provider: Dianna Benton MD       Discharge Diagnoses   Patient Active Problem List   Diagnosis     Indication for care in labor or delivery     Gestational hypertension     ABLA (acute blood loss anemia)     S/P primary low transverse        History of Present Illness   Linus Lewis is a 42 year old female now  who presented to L&D @ 38w5d GA in labor, divert from [facility]. Her pregnancy has been complicated by AMA, thyroid disease, GBS pos. Please see her admit H&P for full details of her PMH, PSH, Meds, Allergies and exam on admit.    Hospital Course   Surgery Date:            2022  Surgeon:        Dianna Benton MD,  Assistants:     None     Pre-op Diagnosis:     1. Intrauterine pregnancy at 38w5d                                      2. Persistent cat II FHT                                      3. Narrow pelvic outlet                                       4.  IOL for GHTN                                      5.  Desires permanent sterilization  Post-op Diagnosis:   1. Same                                       2. Viable male infant   Procedure:     Primary  section with 2 layer uterine closure via Pfannenstiel skin incision and low transverse uterine incision     Anesthesia:    Epidural, spinal  EBL:    380 mL  IVF:     Per Anesthesia  UOP:   clear urine at the end of the case  Drains: Sharif Catheter   Specimens:    Cord blood, Left fallopian tube  Complications:          None   Indications:   Linus Lewis is a 42 year old  at 38w5d admitted for IOL for GHTN (diverted from [facility]).  She underwent IOL with cytotec x1, SROM, and pitocin.  FHT noted to be persistently cat II.  When cervix was reducible, trial of labor was done - fetal decel noted and pelvic outlet felt to be very narrow.  Decision made to proceed with PCS.   Pt also desires permanent sterilization.  The risks, benefits, and alternatives of  section were discussed with the patient, and she agreed to proceed.  Risks include, but not limited to, pain, bleeding, infection, injury to bowel/bladder/ureters, and need for further surgery.     Findings:   1. Liveborn male infant in cephalic presentation. Apgars 8/9. Weight 7# 4oz.  2. Thin meconium stained amniotic fluid  3. Normal uterus, fallopian tubes, and ovaries.   1. Right fallopian tube very superior and posterior - inaccessible, thus was left in situ  4. Edematous tissue, consistent with long labor  5. Slight extension on left edge of hysterotomy       Her postpartum course was complicated by ABLA, with Hb stable at 9.1. She received a total of 2 doses of IV venofer and she was placed on PO iron at the time of dscharge. Iron rich diet and vit C were discussed. Her blood pressures were mild range on POD#2 with symptomatic edema to her b/l LE at which time R/B of IV lasix was reviewed and she was amenable.. On postpartum day 3, she was meeting all of her postpartum goals and deemed stable for discharge. She was voiding without difficulty, tolerating a regular diet without nausea and vomiting, her pain was well controlled on oral pain medicines and her lochia was appropriate.    - GHTN: she was started on Labetalol 200mg BID on POD#2 for persistently mild range Bps  - Transaminitis: AST 66, ALT 49 on POD#3.  Asymptomatic.  - Elevated creatinine: Cr 1.46 on POD#0, then trended down to normal range on POD#1  - Edema: s/p Lasix x1 with significant improvement    Hgb:   Lab Results   Component Value Date    HGB 9.1 2022    HGB 9.5 2022       RH pos and rhogam was not indicated    Contraception and s/p unilateral salpingectomy was reviewed and that additional means are warranted.  states he is going to do a vasectomy.     Instructions:  1) Call for temperature greater than 100.4F, foul smelling  vaginal discharge, bleeding more than 1 pad per hour for 2 hrs, pain not controlled by oral pain meds, severe constipation or severe nausea or vomiting.  2) She was instructed to follow-up with her primary OB within 1 week for BP check and repeat LFTs, and then in 6 weeks for a routine postpartum visit  3) She was instructed to continue her Labetalol 200mg BID, as well as PNV on discharge if she wished to breast feed her infant.      Discharge Disposition   Discharged to home   Condition at discharge: Stable    Primary Care Physician   No primary care provider on file.    Consultations This Hospital Stay   ANESTHESIOLOGY IP CONSULT  CARE MANAGEMENT / SOCIAL WORK IP CONSULT  LACTATION IP CONSULT  CARE MANAGEMENT / SOCIAL WORK IP CONSULT    Discharge Orders      Breast pump    Breast Pump Documentation:  Manual/Electric Pump: To support adequate breast milk production and nutrition for infant.     I, the undersigned, certify that the above prescribed supplies are medically necessary for this patient and is both reasonable and necessary in reference to accepted standards of medical and necessary in reference to accepted standards of medical practice in the treatment of this patient's condition and is not prescribed as a convenience.     Discharge Medications   Current Discharge Medication List      CONTINUE these medications which have NOT CHANGED    Details   omeprazole (PRILOSEC) 20 MG DR capsule Take 20 mg by mouth      sertraline (ZOLOFT) 50 MG tablet Take 50 mg by mouth daily      Prenatal Vit-Fe Fumarate-FA (PRENATAL VITAMIN) 27-0.8 MG TABS Take 1 tablet by mouth daily           Allergies   No Known Allergies

## 2022-07-26 NOTE — PLAN OF CARE
Patient VSS, good pain control with medication as per MAR.  Sharif discontinued, Voided x 2, Ambulating and self cares independently.  Incision is intact, Foarm dressing is intact. Breastfeeding attempted prior to supplementation with formula between 10-20 ml. FOB at the bedside and attentive to Mom and active in baby cares. Mother and baby bonding noted

## 2022-07-26 NOTE — PLAN OF CARE
Data: VSS and WNL. Postpartum checks WNL - see flow record. Patient eating and drinking normally. Patient able to empty bladder independently and is up ambulating. Incision is covered with a foam pressure dressing, clean and intact.  Patient performing self cares and is able to care for infant. Patient is breastfeeding Q. 2-3 hrs and is pumping afterwards. Patient requested donor milk for infant (x3). Donor milk consent signed - in chart.  Action: Patient medicated with Tylenol and Ibuprofen during the shift for incisional pain and cramping. See MAR.    Response: Positive attachment behaviors observed with infant. Spouse is present, supportive and attentive to both patient and infant.   Plan: Continue with plan of care.

## 2022-07-26 NOTE — PROGRESS NOTES
[facility]   Brief Post-partum Note    Name:  Linus Lewis  MRN: [id number]    S: Patient states she is doing OK.  Pain is controlled, just more itchig over incisoin.  She is noticing that it is harder to walk because she is having swelling up to her inner thighs.  Also, her hands/fingers are tingling - what is this from?  Tolerating regular diet without nausea or vomiting.  Ambulating without dizziness.  Voiding spontaneously, passing flatus but no bowel movement as of yet. Lochia less than menses.  Breast and formula feeding.      O:   Patient Vitals for the past 24 hrs:   BP Temp Temp src Resp   22 0946 (!) 139/93 -- Oral 16   22 0815 (!) 152/91 -- -- --   22 0759 (!) 159/90 97.7  F (36.5  C) Oral 16   22 0045 -- 98.2  F (36.8  C) Oral 16   22 132/79 98.5  F (36.9  C) Oral 18   22 1604 137/77 97.5  F (36.4  C) Oral 16     Gen:  Resting comfortably, NAD  CV:  Regular rate and rhythm   Pulm:  Non-labored breathing.  No cough or wheezing.   Abd:  Soft, appropriately tender to palpation, non-distended.  Fundus at  umbilicus, firm and non-tender.  Incision: Overlying steri-strips in placed with scant amount of dried serosanguinous drainage.  Wound edges well approximately, no findings of erythema or induration.  Ext:  Non-tender, 2+ LE edema b/l does have slightly pitting edema to mid thigh      Assessment/Plan:  42 year old  on POD #2 s/p unscheduled PLTCS for category II FHR during active second stage along with unilateral salpingectomy. Continue with routine postpartum management.     GHTN:   - BPs as above, q2 hours while awake, every 4 hours while sleeping  - HELLP labs WNL on admission  - BP and incision check in 1 week, reviewed  - Plan on discharge tomorrow on POD#3  - Given symptomatic swelling, reviewed R/B of Lasix and she is amenable to one dose.      Pain: Well-controlled with oral medications  Hgb: 12.2>EBL 380cc> 9.1. VSS as noted above,  asymptomatic.  IV iron today, PO on discharge.   GI:  BID Senna/Colace.  PRN Simethicone.   PPx:  Encouraged ambulation   Rh: Positive  Rubella: Immune  Feed: Breast and formula  BC: Unilateral salpingectomy; patient is aware. Discussed hormones, interval unilateral laparoscopic salpingectomy.   is stating that for everything she went through that he will likely get vasectomy.  Reviewed this is done with urology and needs 3 month SA to ensure azoospermia.      Dispo: Plan for home tomorow.     Zamzam Mota MD   Pager: [phone]   July 27, 2022

## 2022-07-26 NOTE — PLAN OF CARE
VSS and WNL. Postpartum checks WNL - see flow record. Patient eating and drinking normally. Patient has a powers d/t minimal U/O since C/S.  Incision is cover with a foam pressure dressing that is dry and intact. Patient is able to care for infant. Patient is attempting to breastfeeding Q 2-3 hrs with minimal assistance. Patient medicated with Tylenol and Toradol during the shift for incisional pain. See MAR. Positive bonding observed between mother and father of baby. Spouse has been present, attentive and participating in the care of baby and patient  Encouraged patient to sit on side of bed as tolerated and encouraged frequent oral fluids.

## 2022-07-27 PROCEDURE — 120N000001 HC R&B MED SURG/OB

## 2022-07-27 PROCEDURE — 250N000011 HC RX IP 250 OP 636: Performed by: OBSTETRICS & GYNECOLOGY

## 2022-07-27 PROCEDURE — 250N000013 HC RX MED GY IP 250 OP 250 PS 637: Performed by: OBSTETRICS & GYNECOLOGY

## 2022-07-27 PROCEDURE — 258N000003 HC RX IP 258 OP 636: Performed by: OBSTETRICS & GYNECOLOGY

## 2022-07-27 RX ORDER — FUROSEMIDE 10 MG/ML
40 INJECTION INTRAMUSCULAR; INTRAVENOUS ONCE
Status: COMPLETED | OUTPATIENT
Start: 2022-07-27 | End: 2022-07-27

## 2022-07-27 RX ORDER — LABETALOL 200 MG/1
200 TABLET, FILM COATED ORAL EVERY 12 HOURS SCHEDULED
Status: DISCONTINUED | OUTPATIENT
Start: 2022-07-27 | End: 2022-07-28 | Stop reason: HOSPADM

## 2022-07-27 RX ADMIN — SENNOSIDES AND DOCUSATE SODIUM 1 TABLET: 50; 8.6 TABLET ORAL at 21:22

## 2022-07-27 RX ADMIN — PANTOPRAZOLE SODIUM 40 MG: 40 TABLET, DELAYED RELEASE ORAL at 07:58

## 2022-07-27 RX ADMIN — SERTRALINE HYDROCHLORIDE 50 MG: 50 TABLET ORAL at 10:13

## 2022-07-27 RX ADMIN — SENNOSIDES AND DOCUSATE SODIUM 1 TABLET: 50; 8.6 TABLET ORAL at 10:13

## 2022-07-27 RX ADMIN — ACETAMINOPHEN 975 MG: 325 TABLET, FILM COATED ORAL at 18:35

## 2022-07-27 RX ADMIN — ACETAMINOPHEN 975 MG: 325 TABLET, FILM COATED ORAL at 06:10

## 2022-07-27 RX ADMIN — IBUPROFEN 800 MG: 800 TABLET, FILM COATED ORAL at 18:36

## 2022-07-27 RX ADMIN — ACETAMINOPHEN 975 MG: 325 TABLET, FILM COATED ORAL at 13:09

## 2022-07-27 RX ADMIN — FUROSEMIDE 40 MG: 10 INJECTION, SOLUTION INTRAMUSCULAR; INTRAVENOUS at 13:10

## 2022-07-27 RX ADMIN — IBUPROFEN 800 MG: 800 TABLET, FILM COATED ORAL at 06:10

## 2022-07-27 RX ADMIN — IBUPROFEN 800 MG: 800 TABLET, FILM COATED ORAL at 00:42

## 2022-07-27 RX ADMIN — ENOXAPARIN SODIUM 40 MG: 40 INJECTION SUBCUTANEOUS at 18:36

## 2022-07-27 RX ADMIN — IRON SUCROSE 300 MG: 20 INJECTION, SOLUTION INTRAVENOUS at 09:56

## 2022-07-27 RX ADMIN — IBUPROFEN 800 MG: 800 TABLET, FILM COATED ORAL at 13:09

## 2022-07-27 RX ADMIN — ACETAMINOPHEN 975 MG: 325 TABLET, FILM COATED ORAL at 00:41

## 2022-07-27 RX ADMIN — LABETALOL HYDROCHLORIDE 200 MG: 200 TABLET, FILM COATED ORAL at 21:54

## 2022-07-27 ASSESSMENT — ACTIVITIES OF DAILY LIVING (ADL)
ADLS_ACUITY_SCORE: 18

## 2022-07-27 NOTE — PLAN OF CARE
Data: Postpartum checks within normal limits - see flow record. Patient eating and drinking normally. Patient able to empty bladder independently and is up ambulating. Incision is approximated, open to air and covered with steri strips. Scant (dry) serosanginous drainage. Patient performing self cares and is able to care for infant. Patient is breastfeeding, pumping (getting 20-22 mL) and supplementing with donor milk (25mL). Action: Patient medicated during the shift for incisional pain. See MAR.    Response: Positive attachment behaviors observed with infant. Spouse is present and supportive.  Plan: Continue with Q. 2 BP checks and daily weights.

## 2022-07-27 NOTE — PLAN OF CARE
VS WNL.  Pt denies headache, visual disturbances, and RUQ pain.  Pt c/o dizziness when ambulating.  Educated on fall risk precautions and to call for help prn.  Pt requests po iron this evening and would like to sleep; pt considering Venofer in AM.  Breastfeeding with staff assist and pumping after feeds.  Supplementing infant with donor milk.  Lactation visit this evening (see note).  Spouse at bedside and supportive.  Positive bonding with infant observed.  Continue to monitor.

## 2022-07-27 NOTE — PROVIDER NOTIFICATION
07/27/22 0847   Provider Notification   Provider Name/Title Dr. Osman   Method of Notification Phone   Request Evaluate-Remote   Notification Reason Status Update     /90, 152/91 on re-check 15 minutes late. Patient also states she has increasing edema. Dr. Osman stated it is ok to give IV iron and plan to check BPs q2 hours today. Primary nurse notified.

## 2022-07-27 NOTE — PLAN OF CARE
"Data: VSS and WNL. Postpartum checks WNL - see flow record. Patient eating and drinking normally. Patient able to empty bladder independently and is up ambulating. Incision is approximated, covered w/ steri strips, and scant serosanginous drainage present. Patient performing self cares and is able to care for infant. Patient is breastfeeding (LATCH score 5), pumping and supplementing w/ donor breast milk.   Action: Patient medicated w/ Tylenol and Ibuprofen during the shift for incisional pain . See MAR.    Response: Positive attachment behaviors observed between infant and both parents. Spouse is present, supportive and attentive to patient and infant.   Plan: Anticipate discharge on 7/27.    Update @ 2000: Patient states \"I am a little bit dizzy when ambulating or going to the bathroom\". Patient has requested oral iron supplementation. Dr. Oreilly electronic paged @ [phone] to evaluated and notified of patient's request and an order for oral iron and IV Venofer was placed for patient. Discuss with patient the benefits of both oral and IV iron to determine what route the patient would like to go (oral or IV).   "

## 2022-07-27 NOTE — LACTATION NOTE
Lactation visit. Parents have been supplementing with formula now switched to donor due to baby not being content after feeds via bottle. Nipples on Linus sore, left nipple bruised. Patient has shield at bedside but FOB stated he really would like infant to nurse without. Discussed uses of nipple shield, and that it could provide some comfort to Linus. Baby latched without shield, with much discomfort, despite wide mouth. Some swallows heard and pointed out to parents. Breast compressions shown. Baby nursed both sides, cross cradle on right and football shown on left. Reviewed positioning and breastfeeding education. Baby fussy and rooting after feed, supplemented with pumped breast milk on side table, needing another 15 mls of donor milk. Patient states she is not able to pump and her nipples need a break. Encouraged feeding every 3 hours, and could  by infants cues if supplementing is needed post breastfeeding. Many questions answered at this time. Knows to call for assistance as needed.

## 2022-07-27 NOTE — PLAN OF CARE
Data: Patient c/o increased edema and BP was elevated (159/190) - 15 min recheck was 152/91 . Patient denies preeclampsia symptoms and lung sounds are clear (equal- bilateral). No cough or SOB. Patient is dizzy and requested IV venofer this morning.     Action: Dr. Mota was paged by me to evaluate new symptoms and vital signs.     Plan: IV Venofer, IV Lasix, Q. 2 BP, and delay discharge till tomorrow if improved.       07/27/22 0805   Provider Notification   Provider Name/Title Dr. Osman   Method of Notification Electronic Page - Phone   Request Evaluate-Remote   Notification Reason Status Update

## 2022-07-27 NOTE — PLAN OF CARE
Patient VSS, good pain control with medication as per MAR.  Ambulating and self cares independently. Incision is intact, steri-strips, open to air, not S/S of infection. Breastfeeding attempted with shield prior to supplementation with donor milk 15 - 18 ml. Reinforced the need to feed every 2-3 hours wake the baby.  FOB at the bedside and attentive to Mom and active in baby cares. Mother and baby bonding noted.

## 2022-07-27 NOTE — PLAN OF CARE
"Data: Patient reports feeling \"better- less dizzy and tired\" after receiving IV Venofer and Lasix. Patient's BP are improving (135/78). Patient was weighed (104.8 kg). Patient requested a later dose of Lasix so she could attempt pumping before medication was administered.     "

## 2022-07-27 NOTE — PLAN OF CARE
Data: IV Venofer started at 0956. Vital signs before infusion (T: 98.0 BP: 139/93 HR: 89 RR: 16). Infusion completed at 1146. Vital signs after infusion: T: 98.1  /89 HR: 89 RR: 16. Infusion tolerated well.     Plan: Continue to monitor BP Q. 2 hrs.

## 2022-07-27 NOTE — PLAN OF CARE
Blood pressures elevated but not within severe range. All other vital signs stable on room air. Bonding well with infant.  at bedside and supportive. Breastfeeding well per patient, pt did not call out for additional support. Independent with cares for self and infant. Tolerating a regular diet.

## 2022-07-28 VITALS
HEART RATE: 91 BPM | BODY MASS INDEX: 37.17 KG/M2 | OXYGEN SATURATION: 98 % | SYSTOLIC BLOOD PRESSURE: 145 MMHG | HEIGHT: 66 IN | TEMPERATURE: 98.6 F | RESPIRATION RATE: 16 BRPM | WEIGHT: 231.26 LBS | DIASTOLIC BLOOD PRESSURE: 98 MMHG

## 2022-07-28 LAB
ALT SERPL W P-5'-P-CCNC: 49 U/L (ref 10–35)
AST SERPL W P-5'-P-CCNC: 66 U/L (ref 10–35)
CREAT SERPL-MCNC: 0.74 MG/DL (ref 0.51–0.95)
ERYTHROCYTE [DISTWIDTH] IN BLOOD BY AUTOMATED COUNT: 15.9 % (ref 10–15)
GFR SERPL CREATININE-BSD FRML MDRD: >90 ML/MIN/1.73M2
HCT VFR BLD AUTO: 29 % (ref 35–47)
HGB BLD-MCNC: 9 G/DL (ref 11.7–15.7)
MCH RBC QN AUTO: 27.3 PG (ref 26.5–33)
MCHC RBC AUTO-ENTMCNC: 31 G/DL (ref 31.5–36.5)
MCV RBC AUTO: 88 FL (ref 78–100)
PLATELET # BLD AUTO: 217 10E3/UL (ref 150–450)
RBC # BLD AUTO: 3.3 10E6/UL (ref 3.8–5.2)
WBC # BLD AUTO: 9.8 10E3/UL (ref 4–11)

## 2022-07-28 PROCEDURE — 82565 ASSAY OF CREATININE: CPT | Performed by: OBSTETRICS & GYNECOLOGY

## 2022-07-28 PROCEDURE — 250N000013 HC RX MED GY IP 250 OP 250 PS 637: Performed by: OBSTETRICS & GYNECOLOGY

## 2022-07-28 PROCEDURE — 999N000079 HC STATISTIC IP LACTATION SERVICES 1-15 MIN

## 2022-07-28 PROCEDURE — 85018 HEMOGLOBIN: CPT | Performed by: OBSTETRICS & GYNECOLOGY

## 2022-07-28 PROCEDURE — 84450 TRANSFERASE (AST) (SGOT): CPT | Performed by: OBSTETRICS & GYNECOLOGY

## 2022-07-28 PROCEDURE — 250N000011 HC RX IP 250 OP 636: Performed by: OBSTETRICS & GYNECOLOGY

## 2022-07-28 PROCEDURE — 36415 COLL VENOUS BLD VENIPUNCTURE: CPT | Performed by: OBSTETRICS & GYNECOLOGY

## 2022-07-28 PROCEDURE — 84460 ALANINE AMINO (ALT) (SGPT): CPT | Performed by: OBSTETRICS & GYNECOLOGY

## 2022-07-28 RX ORDER — LABETALOL 200 MG/1
200 TABLET, FILM COATED ORAL EVERY 12 HOURS
Qty: 90 TABLET | Refills: 0 | Status: SHIPPED | OUTPATIENT
Start: 2022-07-28

## 2022-07-28 RX ORDER — BISACODYL 10 MG
10 SUPPOSITORY, RECTAL RECTAL DAILY PRN
COMMUNITY
Start: 2022-07-28

## 2022-07-28 RX ADMIN — SERTRALINE HYDROCHLORIDE 50 MG: 50 TABLET ORAL at 09:33

## 2022-07-28 RX ADMIN — ENOXAPARIN SODIUM 40 MG: 40 INJECTION SUBCUTANEOUS at 17:34

## 2022-07-28 RX ADMIN — LABETALOL HYDROCHLORIDE 200 MG: 200 TABLET, FILM COATED ORAL at 09:53

## 2022-07-28 RX ADMIN — PANTOPRAZOLE SODIUM 40 MG: 40 TABLET, DELAYED RELEASE ORAL at 09:32

## 2022-07-28 RX ADMIN — IBUPROFEN 800 MG: 800 TABLET, FILM COATED ORAL at 06:43

## 2022-07-28 RX ADMIN — SENNOSIDES AND DOCUSATE SODIUM 1 TABLET: 50; 8.6 TABLET ORAL at 09:32

## 2022-07-28 RX ADMIN — ACETAMINOPHEN 975 MG: 325 TABLET, FILM COATED ORAL at 00:34

## 2022-07-28 RX ADMIN — IBUPROFEN 800 MG: 800 TABLET, FILM COATED ORAL at 00:34

## 2022-07-28 ASSESSMENT — ACTIVITIES OF DAILY LIVING (ADL)
ADLS_ACUITY_SCORE: 18

## 2022-07-28 NOTE — DISCHARGE INSTRUCTIONS
Preeclampsia   Call your doctor right away if you have any of the following:  - Edema (swelling) in your face or hands  - Rapid weight gain-about 1 pound or more in a day  - Headache  - Abdominal pain on your right side  - Vision problems (flashes or spots)  - You have questions or concerns once you return home.    Postop  Birth Instructions    Activity     Do not lift more than 10 pounds for 6 weeks after surgery.  Ask family and friends for help when you need it.  No driving until you have stopped taking your pain medications (usually two weeks after surgery).  No heavy exercise or activity for 6 weeks.  Don't do anything that will put a strain on your surgery site.  Don't strain when using the toilet.  Your care team may prescribe a stool softener if you have problems with your bowel movements.     To care for your incision:     Keep the incision clean and dry.  Do not soak your incision in water. No swimming or hot tubs until it has fully healed. You may soak in the bathtub if the water level is below your incision.  Do not use peroxide, gel, cream, lotion, or ointment on your incision.  Adjust your clothes to avoid pressure on your surgery site (check the elastic in your underwear for example).     You may see a small amount of clear or pink drainage and this is normal.  Check with your health care provider:     If the drainage increases or has an odor.  If the incision reddens, you have swelling, or develop a rash.  If you have increased pain and the medicine we prescribed doesn't help.  If you have a fever above 100.4 F (38 C) with or without chills when placing thermometer under your tongue.   The area around your incision (surgery wound), will feel numb.  This is normal. The numbness should go away in less than a year.     Keep your hands clean:  Always wash your hands before touching your incision (surgery wound). This helps reduce your risk of infection. If your hands aren't dirty, you may use an  alcohol hand-rub to clean your hands. Keep your nails clean and short.    Call your healthcare provider if you have any of these symptoms:     You soak a sanitary pad with blood within 1 hour, or you see blood clots larger than a golf ball.  Bleeding that lasts more than 6 weeks.  Vaginal discharge that smells bad.  Severe pain, cramping or tenderness in your lower belly area.  A need to urinate more frequently (use the toilet more often), more urgently (use the toilet very quickly), or it burns when you urinate.  Nausea and vomiting.  Redness, swelling or pain around a vein in your leg.  Problems breastfeeding or a red or painful area on your breast.  Chest pain and cough or are gasping for air.  Problems with coping with sadness, anxiety or depression. If you have concerns about hurting yourself or the baby, call your provider immediately.    You have questions or concerns after you return home.

## 2022-07-28 NOTE — PLAN OF CARE
VSS besides continued elevated BPs. MD ordered for patient to start on 200 mg of Labetalol q12 hrs during shift to help bring down BPs. Up independently and voiding without difficulty. Patient is breastfeeding infant and pumping post feed every 2-3 hours. Patient's nipples are very sore and were bleeding. Given nipple cream and hydrogels to use for discomfort. Patient decided during shift to take a break from breastfeeding and to pump on a lower setting that was valerie tolerable. Plans to continue breastfeeding and pumping in AM but wanted to wait until it was more tolerable.  Pain beng managed with tylenol and ibuprofen. Incision WDL. Fundus midline and firm. Scant lochia. Patient able to perform all of own cares and infant cares. FOB a bedside and very supportive. Parents bonding well with infant.

## 2022-07-28 NOTE — PROVIDER NOTIFICATION
07/27/22 2140   Provider Notification   Provider Name/Title Dr. Nixon   Method of Notification Phone   Request Evaluate-Remote   Notification Reason Status Update   Notified MD of continuously elevated BP's. MD ordered for 200mg Labetalol P.O. q12 hrs. MD also ordered for patient to get the following AM labs: CBC, ALT, AST, and Creatinine.

## 2022-07-28 NOTE — PROGRESS NOTES
Patient Name:  Linus Lewis   MRN:  [id number]  Age:  42 year old    YOB: 1980      POSTPARTUM/POST-OPERATIVE PROGRESS NOTE    Pt is POD#3 s/p unscheduled C/S.  She is doing well without complaints.  Pt is ambulating and tolerating a regular diet.  Sharif is out and she is voiding without complication.  Pain is well controlled with PO medication and lochia is within normal limits.  She is breastfeeding.  Baby is doing well.    Denies headache, vision change, CP, SOB, n/v, upper abdominal pain, or increased swelling.     States LE swelling has greatly improved since receiving Lasix x1.         Objective:    Temp:  [97.5  F (36.4  C)-98.3  F (36.8  C)] 98  F (36.7  C)  Pulse:  [] 103  Resp:  [16-18] 16  BP: (130-159)/() 137/94  231 lbs 4.2 oz      General Appearance:  NAD, A&O x 3, comfortable  Lungs:  unlabored  Cardiovascular:  RRR  Abdomen:  soft, minimally distended; appropriately tender near incision; no rebound or guarding  Fundus:  firm, below the umbilicus  Incision:  clean, dry and intact  Lower extremities:  no significant edema      Lab Review:    ABO/RH(D)   Date Value Ref Range Status   2022 B POS  Final     Hemoglobin   Date Value Ref Range Status   2022 9.0 (L) 11.7 - 15.7 g/dL Final   2022 9.1 (L) 11.7 - 15.7 g/dL Final   2022 9.5 (L) 11.7 - 15.7 g/dL Final     Hematocrit   Date Value Ref Range Status   2022 29.0 (L) 35.0 - 47.0 % Final   2022 29.7 (L) 35.0 - 47.0 % Final   2022 30.2 (L) 35.0 - 47.0 % Final           Assessment:  42 year old  on POD #3 s/p unscheduled PLTCS (with left salpingectomy) for category II FHR during active second stage.     Plan:  - Post-op: recovering well. Pain well controlled. Cont PO pain meds and regular diet. Encourage ambulation.  - GHTN: Bps normal to mild range.  No s/sx of pre-E   - Labetalol 200mg BID was started last night.  Will monitor Bps closely today (q2hr while awake).  Titrate up  Labetalol if needed.   - Transaminitis (AST 66, ALT 49):  Will repeat CMP tomorrow .  Other HELLP labs normal.    - Creatinine: trending down, now wnl.   - Edema: s/p Lasix x1.  Edema has decreased.  Cont to monitor.  - ABLA: s/p IV iron.  Hgb stable. No s/sx of ABLA.  - Contraception: Only left salpingectomy able to be safely completed at time of PCS.   will get vasectomy; aware that they will need backup contraception until azospermia confirmed.  - Dispo: anticipate DC tomorrow if Bps well controlled      Meredith Chan, MD [facility]  July 28, 2022

## 2022-07-28 NOTE — PROGRESS NOTES
Reviewed Bps and labs.  Patient is very motivated to go home today.   Denies headache, vision change, CP, SOB, n/v, upper abdominal pain, or increased swelling.   She is very reliable, lives close to [place], and is able to monitor Bps at home.      Will DC today with close follow-up in clinic (ie, within 1 week - ideally Mon or Tues of next week) for BP check and repeat CMP.    She is to call office if she has Bps 140/90 or above, or if she develops headache, vision change, CP, SOB, n/v, upper abdominal pain, or increased swelling.

## 2022-07-28 NOTE — LACTATION NOTE
Lactation in to see patient before discharge. Nipples continue to be sore, and bruised. Has decided to rest nipples and pump for baby instead of latching infant to breast. Switched patient to main mode. Getting good volumes. Discussed pumping schedule, cleaning and care of pump parts. All questions answered. Encouraged to call for assistance prn.

## 2022-07-28 NOTE — PLAN OF CARE
Data: Vital signs within normal limits. Postpartum checks within normal limits - see flow record. Patient eating and drinking normally. Patient able to empty bladder independently and is up ambulating. No apparent signs of infection. Incision healing well. Patient performing self cares and is able to care for infant.  Action: Patient declined pain medication during shift, denied pain. Patient reassessed within 1 hour after each medication and pain was improved - patient stated she was comfortable. Patient education done about discharge and infant cares. See flow record.  Response: Positive attachment behaviors observed with infant. Support persons are present.     Discharge instructions discussed and all questions and concerns addressed. Pt has a breast pump at home already. Discharge medications sent home with patient. Birth certificate completed. PPD score of 4. Pt discharged to home with  and infant in private transportation.

## 2022-07-28 NOTE — PLAN OF CARE
VSS, except elevated BP- pt asymptomatic. Incision WDL. Up independently, voiding without difficulty. Pain managed with ibuprofen and tylenol. Breastfeeding and pumping, encouraged to call for assistance with latching. Positive bonding observed with infant. FOB supportive and involved in cares'

## 2022-07-29 NOTE — PLAN OF CARE
Data: Vital signs within normal limits. Postpartum checks within normal limits - see flow record. Patient eating and drinking normally. Patient able to empty bladder independently and is up ambulating. No apparent signs of infection. Incision healing well. Patient performing self cares and is able to care for infant.  Action: Patient medicated during the shift for pain. See MAR. Patient reassessed within 1 hour after each medication and pain was improved - patient stated she was comfortable. Patient education done about discharge and  care. See flow record.  Response: Positive attachment behaviors observed with infant. Support person present.     Discharged home with  and infant.

## 2022-09-25 ENCOUNTER — HEALTH MAINTENANCE LETTER (OUTPATIENT)
Age: 42
End: 2022-09-25

## 2023-10-14 ENCOUNTER — HEALTH MAINTENANCE LETTER (OUTPATIENT)
Age: 43
End: 2023-10-14

## 2024-03-02 ENCOUNTER — HEALTH MAINTENANCE LETTER (OUTPATIENT)
Age: 44
End: 2024-03-02

## 2024-03-15 ENCOUNTER — OFFICE VISIT (OUTPATIENT)
Dept: URGENT CARE | Facility: URGENT CARE | Age: 44
End: 2024-03-15
Payer: COMMERCIAL

## 2024-03-15 VITALS
OXYGEN SATURATION: 96 % | SYSTOLIC BLOOD PRESSURE: 121 MMHG | DIASTOLIC BLOOD PRESSURE: 83 MMHG | WEIGHT: 222.4 LBS | BODY MASS INDEX: 35.9 KG/M2 | TEMPERATURE: 98 F | HEART RATE: 89 BPM

## 2024-03-15 DIAGNOSIS — J02.9 SORE THROAT: ICD-10-CM

## 2024-03-15 DIAGNOSIS — R05.1 ACUTE COUGH: ICD-10-CM

## 2024-03-15 DIAGNOSIS — E06.3 HASHIMOTO'S THYROIDITIS: ICD-10-CM

## 2024-03-15 DIAGNOSIS — J06.9 VIRAL UPPER RESPIRATORY INFECTION: Primary | ICD-10-CM

## 2024-03-15 LAB
DEPRECATED S PYO AG THROAT QL EIA: NEGATIVE
FLUAV AG SPEC QL IA: NEGATIVE
FLUBV AG SPEC QL IA: NEGATIVE
GROUP A STREP BY PCR: NOT DETECTED

## 2024-03-15 PROCEDURE — 87651 STREP A DNA AMP PROBE: CPT | Performed by: PHYSICIAN ASSISTANT

## 2024-03-15 PROCEDURE — 99203 OFFICE O/P NEW LOW 30 MIN: CPT | Performed by: PHYSICIAN ASSISTANT

## 2024-03-15 PROCEDURE — 87804 INFLUENZA ASSAY W/OPTIC: CPT | Performed by: PHYSICIAN ASSISTANT

## 2024-03-15 RX ORDER — BENZONATATE 100 MG/1
100 CAPSULE ORAL 3 TIMES DAILY PRN
Qty: 30 CAPSULE | Refills: 0 | Status: SHIPPED | OUTPATIENT
Start: 2024-03-15 | End: 2024-03-25

## 2024-03-15 RX ORDER — DULOXETIN HYDROCHLORIDE 60 MG/1
120 CAPSULE, DELAYED RELEASE ORAL
COMMUNITY
Start: 2023-12-19

## 2024-03-15 RX ORDER — LOSARTAN POTASSIUM AND HYDROCHLOROTHIAZIDE 12.5; 5 MG/1; MG/1
1 TABLET ORAL DAILY
COMMUNITY

## 2024-03-15 NOTE — PROGRESS NOTES
Chief Complaint   Patient presents with    Cough     ONSET SUNDAY, DRY COUGH, STARTED WITH SORE THROAT, PT TOOK ONE ROUND OF Z PACK GOT BETTER ON WEDNESDAY SX WORSENED, DRY COUGH AND SORE THROAT       Results for orders placed or performed in visit on 03/15/24   Streptococcus A Rapid Screen w/Reflex to PCR - Clinic Collect     Status: Normal    Specimen: Throat; Swab   Result Value Ref Range    Group A Strep antigen Negative Negative   Influenza A & B Antigen - Clinic Collect     Status: Normal    Specimen: Nose; Swab   Result Value Ref Range    Influenza A antigen Negative Negative    Influenza B antigen Negative Negative    Narrative    Test results must be correlated with clinical data. If necessary, results should be confirmed by a molecular assay or viral culture.                 ASSESSMENT:     ICD-10-CM    1. Acute cough  R05.1 Streptococcus A Rapid Screen w/Reflex to PCR - Clinic Collect     Influenza A & B Antigen - Clinic Collect     benzonatate (TESSALON) 100 MG capsule      2. Sore throat  J02.9 Streptococcus A Rapid Screen w/Reflex to PCR - Clinic Collect     Influenza A & B Antigen - Clinic Collect     benzonatate (TESSALON) 100 MG capsule      3. Hashimoto's thyroiditis  E06.3             PLAN: Likely viral upper respiratory infection.  Tessalon Perles.  Further strep test pending.  I have discussed clinical findings with patient.  Side effects of medications discussed.  Symptomatic care is discussed.  I have discussed the possibility of  worsening symptoms and indication to RTC or go to the ER if they occur.  All questions are answered, patient indicates understanding of these issues and is in agreement with plan.   Patient care instructions are discussed/given at the end of visit.   Lots of rest and fluids.      Nicole Kulkarni PA-C      SUBJECTIVE:  44-year-old female presents for dry cough for 5 days.  Some chills initially, no fever.  Sore throat bothers her the most.  History of Hashimoto's  thyroiditis and fibromyalgia.  Had 3 leftover tablets of azithromycin and took 1 a day for the first 3 days.  No vomiting or diarrhea.  Had a negative home COVID test.  No history of asthma or seasonal allergies.      No Known Allergies    Past Medical History:   Diagnosis Date    Anxiety     Depression     Disease of thyroid gland     Fibromyalgia        DULoxetine (CYMBALTA) 60 MG capsule, Take 120 mg by mouth  losartan-hydrochlorothiazide (HYZAAR) 50-12.5 MG tablet, Take 1 tablet by mouth daily  acetaminophen (TYLENOL) 325 MG tablet, Take 3 tablets (975 mg) by mouth every 6 hours (Patient not taking: Reported on 3/15/2024)  bisacodyl (DULCOLAX) 10 MG suppository, Place 1 suppository (10 mg) rectally daily as needed for constipation (Patient not taking: Reported on 3/15/2024)  ferrous sulfate (FEROSUL) 325 (65 Fe) MG tablet, Take 1 tablet (325 mg) by mouth every other day (Patient not taking: Reported on 3/15/2024)  ibuprofen (ADVIL/MOTRIN) 800 MG tablet, Take 1 tablet (800 mg) by mouth every 6 hours as needed for other (cramping) (Patient not taking: Reported on 3/15/2024)  labetalol (NORMODYNE) 200 MG tablet, Take 1 tablet (200 mg) by mouth every 12 hours (Patient not taking: Reported on 3/15/2024)  Prenatal Vit-Fe Fumarate-FA (PRENATAL VITAMIN) 27-0.8 MG TABS, Take 1 tablet by mouth daily (Patient not taking: Reported on 3/15/2024)  senna-docusate (SENOKOT-S/PERICOLACE) 8.6-50 MG tablet, Take 1 tablet by mouth 2 times daily as needed for constipation (Patient not taking: Reported on 3/15/2024)  sertraline (ZOLOFT) 50 MG tablet, Take 50 mg by mouth daily    No current facility-administered medications on file prior to visit.      Social History     Tobacco Use    Smoking status: Never    Smokeless tobacco: Never   Substance Use Topics    Alcohol use: Not Currently     Comment: Alcoholic Drinks/day: Rare       ROS:  CONSTITUTIONAL: Negative for fatigue or fever.  EYES: Negative for eye problems.  ENT: As  above.  RESP: As above.  CV: Negative for chest pains.  GI: Negative for vomiting.  MUSCULOSKELETAL:  Negative for significant muscle or joint pains.  NEUROLOGIC: Negative for headaches.  SKIN: Negative for rash.  PSYCH: Normal mentation for age.    OBJECTIVE:  /83 (BP Location: Left arm, Patient Position: Sitting, Cuff Size: Adult Large)   Pulse 89   Temp 98  F (36.7  C) (Tympanic)   Wt 100.9 kg (222 lb 6.4 oz)   SpO2 96%   BMI 35.90 kg/m    GENERAL APPEARANCE: Healthy, alert and no distress.  EYES:Conjunctiva/sclera clear.  EARS: No cerumen.   Ear canals w/o erythema.  TM's intact w/o erythema.    THROAT: Mild  erythema w/o tonsillar enlargement . No exudates.  NECK: Supple, nontender, no lymphadenopathy.  RESP: Lungs clear to auscultation - no rales, rhonchi or wheezes  CV: Regular rate and rhythm, normal S1 S2, no murmur noted.  NEURO: Awake, alert    SKIN: No rashes      Nicole Kulkarni PA-C

## 2024-12-07 ENCOUNTER — HEALTH MAINTENANCE LETTER (OUTPATIENT)
Age: 44
End: 2024-12-07

## (undated) DEVICE — SOL NACL 0.9% IRRIG 1000ML BOTTLE 2F7124

## (undated) DEVICE — PACK C-SECTION LF PL15OTA83B

## (undated) DEVICE — SOL WATER IRRIG 1000ML BOTTLE 2F7114

## (undated) DEVICE — LINEN DRAPE 54X72" 5467

## (undated) DEVICE — GLOVE PROTEXIS W/NEU-THERA 6.5  2D73TE65

## (undated) DEVICE — GLOVE PROTEXIS BLUE W/NEU-THERA 7.0  2D73EB70

## (undated) DEVICE — Device

## (undated) DEVICE — SUCTION MITY VAC MUSHROOM CUP 10007LP

## (undated) DEVICE — GLOVE PROTEXIS POWDER FREE SMT 6.0  2D72PT60X

## (undated) DEVICE — TRANSFER DEVICE BLOOD NDL HOLDER 364880

## (undated) DEVICE — ESU LIGASURE OPEN SEALER/DIVIDER SM JAW 16.5MM LF1212A

## (undated) DEVICE — BAG CLEAR TRASH 1.3M 39X33" P4040C

## (undated) DEVICE — DRSG STERI STRIP 1/2X4" R1547

## (undated) DEVICE — PREP CHLORAPREP 26ML TINTED HI-LITE ORANGE 930815

## (undated) DEVICE — CATH TRAY FOLEY 16FR SILICONE 907416

## (undated) DEVICE — GLOVE PROTEXIS BLUE W/NEU-THERA 6.0  2D73EB60

## (undated) DEVICE — SOL NACL 0.9% IRRIG 1000ML BOTTLE 07138-09

## (undated) DEVICE — DRSG ABDOMINAL 07 1/2X8" 7197D

## (undated) DEVICE — BLADE CLIPPER SGL USE 9680

## (undated) DEVICE — ESU PENCIL SMOKE EVAC W/ROCKER SWITCH 0703-047-000

## (undated) DEVICE — LINEN FULL SHEET 5511

## (undated) DEVICE — STOCKING SLEEVE VASOPRESS COMPRESSION CALF MED VP501M

## (undated) DEVICE — SU VICRYL 0 CT 36" J358H

## (undated) DEVICE — LINEN TOWEL PACK X10 5473

## (undated) DEVICE — ESU GROUND PAD ADULT W/CORD E7507

## (undated) DEVICE — SU VICRYL 3-0 KS 27" UND J663H

## (undated) DEVICE — SU MONOCRYL 0 CT 36" Y358H

## (undated) DEVICE — LINEN HALF SHEET 5512

## (undated) DEVICE — CAP BABY PINK/BLUE IC-2

## (undated) RX ORDER — DEXAMETHASONE SODIUM PHOSPHATE 4 MG/ML
INJECTION, SOLUTION INTRA-ARTICULAR; INTRALESIONAL; INTRAMUSCULAR; INTRAVENOUS; SOFT TISSUE
Status: DISPENSED
Start: 2022-07-25

## (undated) RX ORDER — MORPHINE SULFATE 1 MG/ML
INJECTION, SOLUTION EPIDURAL; INTRATHECAL; INTRAVENOUS
Status: DISPENSED
Start: 2022-07-25

## (undated) RX ORDER — ONDANSETRON 2 MG/ML
INJECTION INTRAMUSCULAR; INTRAVENOUS
Status: DISPENSED
Start: 2022-07-25

## (undated) RX ORDER — FENTANYL CITRATE-0.9 % NACL/PF 10 MCG/ML
PLASTIC BAG, INJECTION (ML) INTRAVENOUS
Status: DISPENSED
Start: 2022-07-25

## (undated) RX ORDER — KETOROLAC TROMETHAMINE 30 MG/ML
INJECTION, SOLUTION INTRAMUSCULAR; INTRAVENOUS
Status: DISPENSED
Start: 2022-07-25